# Patient Record
Sex: FEMALE | Race: WHITE | NOT HISPANIC OR LATINO | Employment: OTHER | URBAN - METROPOLITAN AREA
[De-identification: names, ages, dates, MRNs, and addresses within clinical notes are randomized per-mention and may not be internally consistent; named-entity substitution may affect disease eponyms.]

---

## 2018-05-30 ENCOUNTER — TRANSFERRED RECORDS (OUTPATIENT)
Dept: HEALTH INFORMATION MANAGEMENT | Facility: CLINIC | Age: 48
End: 2018-05-30

## 2018-06-04 ENCOUNTER — TRANSFERRED RECORDS (OUTPATIENT)
Dept: HEALTH INFORMATION MANAGEMENT | Facility: CLINIC | Age: 48
End: 2018-06-04

## 2018-08-09 ENCOUNTER — OFFICE VISIT (OUTPATIENT)
Dept: MIDWIFE SERVICES | Facility: CLINIC | Age: 48
End: 2018-08-09
Payer: COMMERCIAL

## 2018-08-09 VITALS
SYSTOLIC BLOOD PRESSURE: 120 MMHG | BODY MASS INDEX: 28.66 KG/M2 | DIASTOLIC BLOOD PRESSURE: 77 MMHG | TEMPERATURE: 98.3 F | WEIGHT: 172 LBS | HEIGHT: 65 IN | HEART RATE: 100 BPM

## 2018-08-09 DIAGNOSIS — Z00.00 ROUTINE GENERAL MEDICAL EXAMINATION AT A HEALTH CARE FACILITY: Primary | ICD-10-CM

## 2018-08-09 LAB
ALBUMIN SERPL-MCNC: 3.9 G/DL (ref 3.4–5)
ALP SERPL-CCNC: 79 U/L (ref 40–150)
ALT SERPL W P-5'-P-CCNC: 27 U/L (ref 0–50)
ANION GAP SERPL CALCULATED.3IONS-SCNC: 7 MMOL/L (ref 3–14)
AST SERPL W P-5'-P-CCNC: 18 U/L (ref 0–45)
BILIRUB DIRECT SERPL-MCNC: <0.1 MG/DL (ref 0–0.2)
BILIRUB SERPL-MCNC: 0.3 MG/DL (ref 0.2–1.3)
BUN SERPL-MCNC: 10 MG/DL (ref 7–30)
CALCIUM SERPL-MCNC: 8.5 MG/DL (ref 8.5–10.1)
CHLORIDE SERPL-SCNC: 103 MMOL/L (ref 94–109)
CHOLEST SERPL-MCNC: 245 MG/DL
CO2 SERPL-SCNC: 28 MMOL/L (ref 20–32)
CREAT SERPL-MCNC: 0.74 MG/DL (ref 0.52–1.04)
ERYTHROCYTE [DISTWIDTH] IN BLOOD BY AUTOMATED COUNT: 13.7 % (ref 10–15)
GFR SERPL CREATININE-BSD FRML MDRD: 84 ML/MIN/1.7M2
GLUCOSE SERPL-MCNC: 80 MG/DL (ref 70–99)
HBA1C MFR BLD: 5.1 % (ref 0–5.6)
HCT VFR BLD AUTO: 41.3 % (ref 35–47)
HDLC SERPL-MCNC: 50 MG/DL
HGB BLD-MCNC: 13.9 G/DL (ref 11.7–15.7)
LDLC SERPL CALC-MCNC: 166 MG/DL
MCH RBC QN AUTO: 29.4 PG (ref 26.5–33)
MCHC RBC AUTO-ENTMCNC: 33.7 G/DL (ref 31.5–36.5)
MCV RBC AUTO: 87 FL (ref 78–100)
NONHDLC SERPL-MCNC: 195 MG/DL
PLATELET # BLD AUTO: 204 10E9/L (ref 150–450)
POTASSIUM SERPL-SCNC: 3.6 MMOL/L (ref 3.4–5.3)
PROT SERPL-MCNC: 7.3 G/DL (ref 6.8–8.8)
RBC # BLD AUTO: 4.73 10E12/L (ref 3.8–5.2)
SODIUM SERPL-SCNC: 138 MMOL/L (ref 133–144)
TRIGL SERPL-MCNC: 146 MG/DL
TSH SERPL DL<=0.005 MIU/L-ACNC: 0.59 MU/L (ref 0.4–4)
WBC # BLD AUTO: 5.4 10E9/L (ref 4–11)

## 2018-08-09 PROCEDURE — 85027 COMPLETE CBC AUTOMATED: CPT | Performed by: ADVANCED PRACTICE MIDWIFE

## 2018-08-09 PROCEDURE — 36415 COLL VENOUS BLD VENIPUNCTURE: CPT | Performed by: ADVANCED PRACTICE MIDWIFE

## 2018-08-09 PROCEDURE — 83036 HEMOGLOBIN GLYCOSYLATED A1C: CPT | Performed by: ADVANCED PRACTICE MIDWIFE

## 2018-08-09 PROCEDURE — 82248 BILIRUBIN DIRECT: CPT | Performed by: ADVANCED PRACTICE MIDWIFE

## 2018-08-09 PROCEDURE — 99396 PREV VISIT EST AGE 40-64: CPT | Performed by: ADVANCED PRACTICE MIDWIFE

## 2018-08-09 PROCEDURE — 80061 LIPID PANEL: CPT | Performed by: ADVANCED PRACTICE MIDWIFE

## 2018-08-09 PROCEDURE — 84443 ASSAY THYROID STIM HORMONE: CPT | Performed by: ADVANCED PRACTICE MIDWIFE

## 2018-08-09 PROCEDURE — 80053 COMPREHEN METABOLIC PANEL: CPT | Performed by: ADVANCED PRACTICE MIDWIFE

## 2018-08-09 NOTE — MR AVS SNAPSHOT
After Visit Summary   8/9/2018    April Fischer    MRN: [id number]           Patient Information     Date Of Birth          1970        Visit Information        Provider Department      8/9/2018 10:30 AM Jose Miguel Rodríguez APRN CNM [facility]        Today's Diagnoses     Routine general medical examination at a health care facility    -  1       Follow-ups after your visit        Additional Services     GASTROENTEROLOGY ADULT REF PROCEDURE ONLY [facility] [phone]       Last Lab Result: Creatinine (mg/dL)       Date                     Value                 11/17/2016               0.77             ----------  Body mass index is 28.62 kg/(m^2).     Needed:  No  Language:  English    Patient will be contacted to schedule procedure.     Please be aware that coverage of these services is subject to the terms and limitations of your health insurance plan.  Call member services at your health plan with any benefit or coverage questions.  Any procedures must be performed at a [place] facility OR coordinated by your clinic's referral office.    Please bring the following with you to your appointment:    (1) Any X-Rays, CTs or MRIs which have been performed.  Contact the facility where they were done to arrange for  prior to your scheduled appointment.    (2) List of current medications   (3) This referral request   (4) Any documents/labs given to you for this referral                  Who to contact     If you have questions or need follow up information about today's clinic visit or your schedule please contact [facility] directly at [phone].  Normal or non-critical lab and imaging results will be communicated to you by MyChart, letter or phone within 4 business days after the clinic has received the results. If you do not hear from us within 7 days, please contact the clinic through MyChart or phone. If you have a critical or  "abnormal lab result, we will notify you by phone as soon as possible.  Submit refill requests through [employer] or call your pharmacy and they will forward the refill request to us. Please allow 3 business days for your refill to be completed.          Additional Information About Your Visit        [employer] Information     [employer] gives you secure access to your electronic health record. If you see a primary care provider, you can also send messages to your care team and make appointments. If you have questions, please call your primary care clinic.  If you do not have a primary care provider, please call [phone] and they will assist you.        Care EveryWhere ID     This is your Care EveryWhere ID. This could be used by other organizations to access your [facility] medical records  [id number]        Your Vitals Were     Pulse Temperature Height Last Period Breastfeeding? BMI (Body Mass Index)    100 98.3  F (36.8  C) (Oral) 5' 5\" (1.651 m) 07/09/2018 (Exact Date) No 28.62 kg/m2       Blood Pressure from Last 3 Encounters:   08/09/18 120/77   11/17/16 115/72   12/18/13 108/68    Weight from Last 3 Encounters:   08/09/18 172 lb (78 kg)   11/17/16 139 lb 8 oz (63.3 kg)   12/18/13 135 lb (61.2 kg)              We Performed the Following     Bilirubin direct     CBC with platelets     Comprehensive metabolic panel     GASTROENTEROLOGY ADULT REF PROCEDURE ONLY [facility] [phone]     Hemoglobin A1c     Lipid Profile (Chol, Trig, HDL, LDL calc)     TSH with free T4 reflex          Today's Medication Changes          These changes are accurate as of 8/9/18 11:51 AM.  If you have any questions, ask your nurse or doctor.               Stop taking these medicines if you haven't already. Please contact your care team if you have questions.     LORazepam 1 MG tablet   Commonly known as:  ATIVAN   Stopped by:  Jose Miguel Rodríguez APRN CNLALIT           ziprasidone 40 MG capsule   Commonly known as:  GEODON   Stopped by:  " Jose Miguel Rodríguez, APRN [facility]                    Primary Care Provider Fax #    Physician No Ref-Primary [phone]       No address on file        Equal Access to Services     CHARLES PATIÑO : Irma corona jensen lizbeth Morales, davidda lufreida, zackaryta kajoselineda lincoln, stephane corderorhona kavitha. So [facility] [phone].    ATENCIÓN: Si habla español, tiene a neal disposición servicios gratuitos de asistencia lingüística. Llame al [phone].    We comply with applicable federal civil rights laws and [place] laws. We do not discriminate on the basis of race, color, national origin, age, disability, sex, sexual orientation, or gender identity.            Thank you!     Thank you for choosing [facility]  for your care. Our goal is always to provide you with excellent care. Hearing back from our patients is one way we can continue to improve our services. Please take a few minutes to complete the written survey that you may receive in the mail after your visit with us. Thank you!             Your Updated Medication List - Protect others around you: Learn how to safely use, store and throw away your medicines at www.disposemymeds.org.          This list is accurate as of 8/9/18 11:51 AM.  Always use your most recent med list.                   Brand Name Dispense Instructions for use Diagnosis    lamoTRIgine 100 MG tablet    LAMICTAL    60 tablet    Take 1.5 tablets (150 mg) by mouth 2 times daily    Bipolar 2 disorder (H)       QUEtiapine 25 MG tablet    SEROquel    30 tablet    Take 1 tablet by mouth nightly as needed (insomnia).    Bipolar 2 disorder (H)       venlafaxine 75 MG Tb24 24 hr tablet    EFFEXOR-ER    90 tablet    Take 1 tablet (75 mg) by mouth daily    Bipolar 2 disorder (H)

## 2018-08-09 NOTE — PROGRESS NOTES
April is a 48 year old  female who presents for annual exam.     Menses are irregular and spotty and normal and irregular lasting 3 days.  Menses flow: spotty.  Patient's last menstrual period was 2018 (exact date).. Using none for contraception.  She is not currently considering pregnancy.  Besides routine health maintenance, she has no other health concerns today .  GYNECOLOGIC HISTORY:  Menarche: 13    April is sexually active with 1male partner(s) and is currently in monogamous relationship with .    History sexually transmitted infections:No STD history  STI testing offered?  Declined  YOSSI exposure: No  History of abnormal Pap smear: NO - age 30- 65 PAP every 3 years recommended  Family history of breast CA: Yes (Please explain): grandmother  Family history of uterine/ovarian CA: No    Family history of colon CA: No    HEALTH MAINTENANCE:  Cholesterol: (  Cholesterol   Date Value Ref Range Status   2016 228 (H) <200 mg/dL Final     Comment:     Desirable:       <200 mg/dl   2013 240 (H) 0 - 200 mg/dL Final     Comment:     LDL Cholesterol is the primary guide to therapy.   The NCEP recommends further evaluation of: patients with cholesterol greater   than 200 mg/dL if additional risk factors are present, cholesterol greater   than   240 mg/dL, triglycerides greater than 150 mg/dL, or HDL less than 40 mg/dL.    History of abnormal lipids: No  Mammo: 16 . History of abnormal Mammo: No.  Regular Self Breast Exams: No  Calcium/Vitamin D intake: source:  dairy Adequate? Yes  TSH: (  TSH   Date Value Ref Range Status   2016 0.75 0.40 - 4.00 mU/L Final    )  Pap; (  Lab Results   Component Value Date    PAP NIL 2016    PAP NIL 2013    PAP NIL 2007    )    HISTORY:  Obstetric History       T2      L3     SAB1   TAB1   Ectopic0   Multiple0   Live Births3       # Outcome Date GA Lbr Humberto/2nd Weight Sex Delivery Anes PTL Lv   5  08  "36w0d  6 lb 11 oz (3.033 kg) F    GUY      Name: Emily   4 Term 07 38w0d  8 lb 3 oz (3.714 kg) M    GUY      Name: Zachary   3 SAB 06     SAB   DEC   2 Term 00 39w0d 36:00 6 lb 12 oz (3.062 kg) F    GUY      Name: Cecilia   1 TAB 92     TAB   DEC      Obstetric Comments   Therapeutic rest.     Past Medical History:   Diagnosis Date     Adjustment disorder with mixed anxiety and depressed mood     meds \"[facility]\" on meds in past. (unsure type rx) Stopped declines restart.     Past Surgical History:   Procedure Laterality Date     NO HISTORY OF SURGERY       Family History   Problem Relation Age of Onset     Psychotic Disorder Mother      Many family members with depression.     Hypertension Maternal Grandmother      Breast Cancer Maternal Grandmother      Neurologic Disorder Maternal Grandmother      Cerebrovascular Disease Maternal Grandfather      Diabetes Paternal Grandfather      Neurologic Disorder Maternal Aunt      Neurologic Disorder Maternal Aunt      Neurologic Disorder Maternal Uncle      Neurologic Disorder Brother      Genetic Disorder Daughter      cystic fibrosis     Social History     Social History     Marital status:      Spouse name: N/A     Number of children: 3     Years of education: N/A     Occupational History      Self     Social History Main Topics     Smoking status: Never Smoker     Smokeless tobacco: None     Alcohol use 0.0 oz/week     0 Standard drinks or equivalent per week      Comment: once a wk     Drug use: No     Sexual activity: Yes     Partners: Male     Birth control/ protection: None     Other Topics Concern     None     Social History Narrative       Current Outpatient Prescriptions:      lamoTRIgine (LAMICTAL) 100 MG tablet, Take 1.5 tablets (150 mg) by mouth 2 times daily, Disp: 60 tablet, Rfl: 3     LORazepam (ATIVAN) 1 MG tablet, Take 1 tablet (1 mg) by mouth every 8 hours as needed for anxiety, Disp: 90 tablet, Rfl: 0     " "QUEtiapine (SEROQUEL) 25 MG tablet, Take 1 tablet by mouth nightly as needed (insomnia)., Disp: 30 tablet, Rfl: none     venlafaxine (EFFEXOR-ER) 75 MG TB24, Take 1 tablet (75 mg) by mouth daily, Disp: 90 tablet, Rfl: 3     ziprasidone (GEODON) 40 MG capsule, Take 1 capsule (40 mg) by mouth At Bedtime, Disp: 90 capsule, Rfl: 3   No Known Allergies    Past medical, surgical, social and family history were reviewed and updated in EPIC.    ROS:   C:     NEGATIVE for fever, chills, change in weight  I:       NEGATIVE for worrisome rashes, moles or lesions  E:     NEGATIVE for vision changes or irritation  E/M: NEGATIVE for ear, mouth and throat problems  R:     NEGATIVE for significant cough or SOB  CV:   NEGATIVE for chest pain, palpitations or peripheral edema  GI:     NEGATIVE for nausea, abdominal pain, heartburn, or change in bowel habits  :   NEGATIVE for frequency, dysuria, hematuria, vaginal discharge, or irregular bleeding  M:     NEGATIVE for significant arthralgias or myalgia  N:      NEGATIVE for weakness, dizziness or paresthesias  E:      NEGATIVE for temperature intolerance, skin/hair changes  P:      NEGATIVE for changes in mood or affect.    EXAM:  /77  Pulse 100  Temp 98.3  F (36.8  C) (Oral)  Ht 5' 5\" (1.651 m)  Wt 172 lb (78 kg)  LMP 07/09/2018 (Exact Date)  Breastfeeding? No  BMI 28.62 kg/m2   BMI: Body mass index is 28.62 kg/(m^2).  Constitutional: healthy, alert and no distress  Head: Normocephalic. No masses, lesions, tenderness or abnormalities  Neck: Neck supple. Trachea midline. No adenopathy. Thyroid symmetric, normal size.   Cardiovascular: RRR.   Respiratory: Negative.   Breast: No nodularity, asymmetry or nipple discharge bilaterally.  Gastrointestinal: Abdomen soft, non-tender, non-distended. No masses, organomegaly.  :  Vulva:  No external lesions, normal female hair distribution, no inguinal adenopathy.    Urethra:  Midline, non-tender, well supported, no " discharge  Vagina:  Moist, pink, no abnormal discharge, no lesions  Uterus:  Normal size, AV , non-tender, freely mobile  Ovaries:  No masses appreciated, non-tender, mobile  Rectal Exam: deferred  Musculoskeletal: extremities normal  Skin: no suspicious lesions or rashes  Psychiatric: Affect appropriate, cooperative,mentation appears normal.     COUNSELING:   Reviewed preventive health counseling, as reflected in patient instructions       Regular exercise       Healthy diet/nutrition       Colon cancer screening   reports that she has never smoked. She has never used smokeless tobacco.    Body mass index is 28.62 kg/(m^2).  Weight management plan: Discussed healthy diet and exercise guidelines and patient will follow up in 12 months in clinic to re-evaluate.  FRAX Risk Assessment    ASSESSMENT:  48 year old female with satisfactory annual exam  (Z00.00) Routine general medical examination at a health care facility  (primary encounter diagnosis)  Comment:   Plan: Lipid Profile (Chol, Trig, HDL, LDL calc),         Hemoglobin A1c, Comprehensive metabolic panel,         GASTROENTEROLOGY ADULT REF PROCEDURE ONLY         [facility] [phone], TSH with free         T4 reflex, Bilirubin direct, CBC with         platelets, CANCELED: Glucose, CANCELED: Hepatic        panel (Albumin, ALT, AST, Bili, Alk Phos, TP)

## 2018-08-09 NOTE — NURSING NOTE
"Chief Complaint   Patient presents with     Physical       Initial /77  Pulse 100  Temp 98.3  F (36.8  C) (Oral)  Ht 5' 5\" (1.651 m)  Wt 172 lb (78 kg)  LMP 2018 (Exact Date)  Breastfeeding? No  BMI 28.62 kg/m2 Estimated body mass index is 28.62 kg/(m^2) as calculated from the following:    Height as of this encounter: 5' 5\" (1.651 m).    Weight as of this encounter: 172 lb (78 kg).  BP completed using cuff size: regular        The following HM Due: NONE      The following patient reported/Care Every where data was sent to:  P ABSTRACT QUALITY INITIATIVES [[id number]]  na      n/a              "

## 2019-03-31 ASSESSMENT — ENCOUNTER SYMPTOMS
FLANK PAIN: 0
NECK MASS: 0
LOSS OF CONSCIOUSNESS: 0
JOINT SWELLING: 0
SINUS PAIN: 0
MEMORY LOSS: 0
PARALYSIS: 0
TASTE DISTURBANCE: 0
MYALGIAS: 1
BOWEL INCONTINENCE: 0
MUSCLE CRAMPS: 1
INSOMNIA: 1
CONSTIPATION: 1
HOT FLASHES: 1
SPEECH CHANGE: 0
DIZZINESS: 0
SORE THROAT: 0
TINGLING: 0
DEPRESSION: 1
DYSURIA: 0
FATIGUE: 1
DECREASED CONCENTRATION: 1
DIFFICULTY URINATING: 0
JAUNDICE: 0
POLYPHAGIA: 0
TROUBLE SWALLOWING: 0
FEVER: 0
SEIZURES: 0
CHILLS: 0
HALLUCINATIONS: 0
PANIC: 0
DIARRHEA: 0
NUMBNESS: 0
HOARSE VOICE: 0
HEARTBURN: 0
WEAKNESS: 0
POLYDIPSIA: 0
VOMITING: 0
SINUS CONGESTION: 0
BLOOD IN STOOL: 0
NAUSEA: 0
RECTAL PAIN: 0
ARTHRALGIAS: 1
NERVOUS/ANXIOUS: 1
DECREASED APPETITE: 0
NECK PAIN: 1
DISTURBANCES IN COORDINATION: 0
SMELL DISTURBANCE: 0
STIFFNESS: 0
HEMATURIA: 0
BLOATING: 1
DECREASED LIBIDO: 0
WEIGHT LOSS: 0
HEADACHES: 1
ABDOMINAL PAIN: 0
MUSCLE WEAKNESS: 0
NIGHT SWEATS: 1
WEIGHT GAIN: 1
INCREASED ENERGY: 1
ALTERED TEMPERATURE REGULATION: 1
BACK PAIN: 1
TREMORS: 0

## 2019-03-31 ASSESSMENT — ANXIETY QUESTIONNAIRES
7. FEELING AFRAID AS IF SOMETHING AWFUL MIGHT HAPPEN: SEVERAL DAYS
2. NOT BEING ABLE TO STOP OR CONTROL WORRYING: NEARLY EVERY DAY
GAD7 TOTAL SCORE: 14
GAD7 TOTAL SCORE: 14
7. FEELING AFRAID AS IF SOMETHING AWFUL MIGHT HAPPEN: SEVERAL DAYS
3. WORRYING TOO MUCH ABOUT DIFFERENT THINGS: NEARLY EVERY DAY
4. TROUBLE RELAXING: NEARLY EVERY DAY
5. BEING SO RESTLESS THAT IT IS HARD TO SIT STILL: SEVERAL DAYS
6. BECOMING EASILY ANNOYED OR IRRITABLE: SEVERAL DAYS
1. FEELING NERVOUS, ANXIOUS, OR ON EDGE: MORE THAN HALF THE DAYS

## 2019-03-31 ASSESSMENT — PATIENT HEALTH QUESTIONNAIRE - PHQ9
SUM OF ALL RESPONSES TO PHQ QUESTIONS 1-9: 15
10. IF YOU CHECKED OFF ANY PROBLEMS, HOW DIFFICULT HAVE THESE PROBLEMS MADE IT FOR YOU TO DO YOUR WORK, TAKE CARE OF THINGS AT HOME, OR GET ALONG WITH OTHER PEOPLE: EXTREMELY DIFFICULT
SUM OF ALL RESPONSES TO PHQ QUESTIONS 1-9: 15

## 2019-04-01 ENCOUNTER — OFFICE VISIT (OUTPATIENT)
Dept: FAMILY MEDICINE | Facility: CLINIC | Age: 49
End: 2019-04-01
Attending: FAMILY MEDICINE
Payer: COMMERCIAL

## 2019-04-01 ENCOUNTER — TELEPHONE (OUTPATIENT)
Dept: GASTROENTEROLOGY | Facility: CLINIC | Age: 49
End: 2019-04-01

## 2019-04-01 VITALS
SYSTOLIC BLOOD PRESSURE: 127 MMHG | BODY MASS INDEX: 31.29 KG/M2 | HEIGHT: 64 IN | DIASTOLIC BLOOD PRESSURE: 76 MMHG | WEIGHT: 183.3 LBS | HEART RATE: 120 BPM

## 2019-04-01 DIAGNOSIS — G44.209 TENSION-TYPE HEADACHE, NOT INTRACTABLE, UNSPECIFIED CHRONICITY PATTERN: ICD-10-CM

## 2019-04-01 DIAGNOSIS — M54.50 CHRONIC BILATERAL LOW BACK PAIN WITHOUT SCIATICA: ICD-10-CM

## 2019-04-01 DIAGNOSIS — R35.0 URINARY FREQUENCY: ICD-10-CM

## 2019-04-01 DIAGNOSIS — Z76.89 ENCOUNTER TO ESTABLISH CARE: Primary | ICD-10-CM

## 2019-04-01 DIAGNOSIS — G47.00 INSOMNIA, UNSPECIFIED TYPE: ICD-10-CM

## 2019-04-01 DIAGNOSIS — F31.9 BIPOLAR AFFECTIVE DISORDER, REMISSION STATUS UNSPECIFIED (H): ICD-10-CM

## 2019-04-01 DIAGNOSIS — R63.5 WEIGHT GAIN: ICD-10-CM

## 2019-04-01 DIAGNOSIS — G89.29 CHRONIC BILATERAL LOW BACK PAIN WITHOUT SCIATICA: ICD-10-CM

## 2019-04-01 DIAGNOSIS — N95.1 PERIMENOPAUSAL SYMPTOMS: ICD-10-CM

## 2019-04-01 DIAGNOSIS — Z76.89 ENCOUNTER TO ESTABLISH CARE: ICD-10-CM

## 2019-04-01 DIAGNOSIS — M54.2 NECK PAIN: ICD-10-CM

## 2019-04-01 LAB
ALBUMIN UR-MCNC: NEGATIVE MG/DL
ANION GAP SERPL CALCULATED.3IONS-SCNC: 8 MMOL/L (ref 3–14)
APPEARANCE UR: CLEAR
BILIRUB UR QL STRIP: NEGATIVE
BUN SERPL-MCNC: 13 MG/DL (ref 7–30)
CALCIUM SERPL-MCNC: 8.9 MG/DL (ref 8.5–10.1)
CHLORIDE SERPL-SCNC: 103 MMOL/L (ref 94–109)
CO2 SERPL-SCNC: 28 MMOL/L (ref 20–32)
COLOR UR AUTO: ABNORMAL
CREAT SERPL-MCNC: 0.72 MG/DL (ref 0.52–1.04)
GFR SERPL CREATININE-BSD FRML MDRD: >90 ML/MIN/{1.73_M2}
GLUCOSE SERPL-MCNC: 95 MG/DL (ref 70–99)
GLUCOSE UR STRIP-MCNC: NEGATIVE MG/DL
HGB UR QL STRIP: NEGATIVE
KETONES UR STRIP-MCNC: NEGATIVE MG/DL
LEUKOCYTE ESTERASE UR QL STRIP: NEGATIVE
MUCOUS THREADS #/AREA URNS LPF: PRESENT /LPF
NITRATE UR QL: NEGATIVE
PH UR STRIP: 7 PH (ref 5–7)
POTASSIUM SERPL-SCNC: 3.7 MMOL/L (ref 3.4–5.3)
RBC #/AREA URNS AUTO: <1 /HPF (ref 0–2)
SODIUM SERPL-SCNC: 139 MMOL/L (ref 133–144)
SOURCE: ABNORMAL
SP GR UR STRIP: 1.01 (ref 1–1.03)
SQUAMOUS #/AREA URNS AUTO: 1 /HPF (ref 0–1)
UROBILINOGEN UR STRIP-MCNC: NORMAL MG/DL (ref 0–2)
WBC #/AREA URNS AUTO: <1 /HPF (ref 0–5)

## 2019-04-01 PROCEDURE — 36415 COLL VENOUS BLD VENIPUNCTURE: CPT | Performed by: FAMILY MEDICINE

## 2019-04-01 PROCEDURE — 90715 TDAP VACCINE 7 YRS/> IM: CPT | Mod: ZF

## 2019-04-01 PROCEDURE — 81001 URINALYSIS AUTO W/SCOPE: CPT | Performed by: FAMILY MEDICINE

## 2019-04-01 PROCEDURE — G0463 HOSPITAL OUTPT CLINIC VISIT: HCPCS | Mod: ZF

## 2019-04-01 PROCEDURE — 25000128 H RX IP 250 OP 636: Mod: ZF

## 2019-04-01 PROCEDURE — 80048 BASIC METABOLIC PNL TOTAL CA: CPT | Performed by: FAMILY MEDICINE

## 2019-04-01 PROCEDURE — 90471 IMMUNIZATION ADMIN: CPT | Mod: ZF

## 2019-04-01 RX ORDER — LEVONORGESTREL/ETHIN.ESTRADIOL 0.1-0.02MG
1 TABLET ORAL DAILY
Qty: 90 TABLET | Refills: 3 | Status: SHIPPED | OUTPATIENT
Start: 2019-04-01 | End: 2020-06-11

## 2019-04-01 SDOH — HEALTH STABILITY: MENTAL HEALTH
DO YOU FEEL STRESS - TENSE, RESTLESS, NERVOUS, OR ANXIOUS, OR UNABLE TO SLEEP AT NIGHT BECAUSE YOUR MIND IS TROUBLED ALL THE TIME - THESE DAYS?: VERY MUCH

## 2019-04-01 SDOH — HEALTH STABILITY: PHYSICAL HEALTH: ON AVERAGE, HOW MANY DAYS PER WEEK DO YOU ENGAGE IN MODERATE TO STRENUOUS EXERCISE (LIKE A BRISK WALK)?: 5 DAYS

## 2019-04-01 SDOH — HEALTH STABILITY: PHYSICAL HEALTH: ON AVERAGE, HOW MANY MINUTES DO YOU ENGAGE IN EXERCISE AT THIS LEVEL?: 30 MIN

## 2019-04-01 ASSESSMENT — MIFFLIN-ST. JEOR: SCORE: 1428.5

## 2019-04-01 ASSESSMENT — PAIN SCALES - GENERAL: PAINLEVEL: NO PAIN (0)

## 2019-04-01 ASSESSMENT — ANXIETY QUESTIONNAIRES: GAD7 TOTAL SCORE: 14

## 2019-04-01 NOTE — LETTER
4/1/2019       RE: April Fischer  [address]   [place] [address]     Dear Colleague,    Thank you for referring your patient, April Fischer, to the [facility] at [facility]. Please see a copy of my visit note below.              Primary Care Center  New Patient Evaluation    Name: April Fischer MRN: [id number]       Age: 48 year old           Chief Complaint:    YOB: 1970       CC:establish care  mutiple issues            HPI and ROS:   HPI:    48 year old female here to establish care.  She has multiple issues - she doesn't feel well.  She feels fatigued and exhausted.  She does not sleep well.  She has nightmares.  Her psychiatrist had given her some sleeping pills but they gave her nightmares. She stopped them after several nights     She also gets frequent headaches.  They are bilateral, she gets nauseated with them, and she has to be in a quiet place.   She also gets headaches with her menses.    She has neck pain is that is bothering her.  She also has low back pain.  She has tried massage and that helps for the moment but does not last.  She is under a lot of stress.  She is also now been using a heating pad.    She is also gained 45 pounds in the last 14 months.  She claims she has not changed her eating habits.  This depresses her.    She has bipolar disorder  and sees a psychiatrist at [facility].  This person sees her about every 3 months and has been regulating her meds.    She feels very anxious. She is on effexor but doesn't feel this helps     She is  [id number]  who gets gyn exams by Dr Rodríguez.  She had a pap last year and was normal.   LMP was 3/14/2019 - gets HA 2 days before menses  and bleeds 1 day - fairly light.  -more like  spotting - happening every  few months, getting some night sweats, feels her memory is going -   Has poor concentration.      She also urinates 10-12  times/day      ROS:Answers for HPI/ROS submitted by the patient on 3/31/2019   JERMAN 7 TOTAL SCORE: 14  If you checked off any problems, how difficult have these problems made it for you to do your work, take care of things at home, or get along with other people?: Extremely difficult  PHQ9 TOTAL SCORE: 15  General Symptoms: Yes  Skin Symptoms: No  HENT Symptoms: Yes  EYE SYMPTOMS: No  HEART SYMPTOMS: No  LUNG SYMPTOMS: No  INTESTINAL SYMPTOMS: Yes  URINARY SYMPTOMS: Yes  GYNECOLOGIC SYMPTOMS: Yes  BREAST SYMPTOMS: No  SKELETAL SYMPTOMS: Yes  BLOOD SYMPTOMS: No  NERVOUS SYSTEM SYMPTOMS: Yes  MENTAL HEALTH SYMPTOMS: Yes  Fever: No  Loss of appetite: No  Weight loss: No  Weight gain: Yes  Fatigue: Yes  Night sweats: Yes  Chills: No  Increased stress: Yes  Excessive hunger: No  Excessive thirst: No  Feeling hot or cold when others believe the temperature is normal: Yes  Loss of height: No  Post-operative complications: No  Surgical site pain: No  Hallucinations: No  Change in or Loss of Energy: Yes  Hyperactivity: No  Confusion: No  Ear pain: No  Ear discharge: No  Hearing loss: No  Tinnitus: No  Nosebleeds: Yes  Congestion: No  Sinus pain: No  Trouble swallowing: No   Voice hoarseness: No  Mouth sores: No  Sore throat: No  Tooth pain: No  Gum tenderness: No  Bleeding gums: No  Change in taste: No  Change in sense of smell: No  Dry mouth: Yes  Hearing aid used: No  Neck lump: No  Heart burn or indigestion: No  Nausea: No  Vomiting: No  Abdominal pain: No  Bloating: Yes  Constipation: Yes  Diarrhea: No  Blood in stool: No  Black stools: No  Rectal or Anal pain: No  Fecal incontinence: No  Yellowing of skin or eyes: No  Vomit with blood: No  Change in stools: Yes  Trouble holding urine or incontinence: Yes  Pain or burning: No  Trouble starting or stopping: No  Increased frequency of urination: Yes  Blood in urine: No  Decreased frequency of urination: No  Frequent nighttime urination: Yes  Flank pain: No  Difficulty  "emptying bladder: No  Back pain: Yes  Muscle aches: Yes  Neck pain: Yes  Swollen joints: No  Joint pain: Yes  Bone pain: No  Muscle cramps: Yes  Muscle weakness: No  Joint stiffness: No  Bone fracture: No  Trouble with coordination: No  Dizziness or trouble with balance: No  Fainting or black-out spells: No  Memory loss: No  Headache: Yes  Seizures: No  Speech problems: No  Tingling: No  Tremor: No  Weakness: No  Difficulty walking: No  Paralysis: No  Numbness: No  Bleeding or spotting between periods: Yes  Heavy or painful periods: No  Irregular periods: Yes  Vaginal discharge: No  Hot flashes: Yes  Vaginal dryness: No  Genital ulcers: No  Reduced libido: No  Painful intercourse: No  Difficulty with sexual arousal: No  Post-menopausal bleeding: No  Nervous or Anxious: Yes  Depression: Yes  Trouble sleeping: Yes  Trouble thinking or concentrating: Yes  Mood changes: Yes  Panic attacks: No             Past Medical History:     Past Medical History:   Diagnosis Date     Adjustment disorder with mixed anxiety and depressed mood     meds \"[facility]\" on meds in past. (unsure type rx) Stopped4/06 declines restart.     Bipolar 2 disorder, major depressive episode (H)      Perimenopause      Weight gain              Past Surgical History:      Past Surgical History:   Procedure Laterality Date     NO HISTORY OF SURGERY               Social History:     Social History     Socioeconomic History     Marital status:      Spouse name: Not on file     Number of children: 3     Years of education: Not on file     Highest education level: Not on file   Occupational History     Employer: SELF   Social Needs     Financial resource strain: Not on file     Food insecurity:     Worry: Not on file     Inability: Not on file     Transportation needs:     Medical: Not on file     Non-medical: Not on file   Tobacco Use     Smoking status: Never Smoker     Smokeless tobacco: Never Used   Substance and Sexual Activity     Alcohol " use: Yes     Alcohol/week: 0.0 oz     Comment: 1-2 beers per week if anything     Drug use: No     Sexual activity: Yes     Partners: Male     Birth control/protection: Male Surgical   Lifestyle     Physical activity:     Days per week: 5 days     Minutes per session: 30 min     Stress: Very much   Relationships     Social connections:     Talks on phone: Not on file     Gets together: Not on file     Attends Congregation service: Not on file     Active member of club or organization: Not on file     Attends meetings of clubs or organizations: Not on file     Relationship status: Not on file     Intimate partner violence:     Fear of current or ex partner: Not on file     Emotionally abused: Not on file     Physically abused: Not on file     Forced sexual activity: Not on file   Other Topics Concern     Parent/sibling w/ CABG, MI or angioplasty before 65F 55M? Not Asked   Social History Narrative    , has an 17 yo with cystic fibrosis - all going to [place] next week as a gift to this 17yo from the [employer].   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    Melania Taylor MD, PhD              Family History:     Family History   Problem Relation Age of Onset     Psychotic Disorder Mother         Many family members with depression.     Hypertension Maternal Grandmother      Breast Cancer Maternal Grandmother      Neurologic Disorder Maternal Grandmother      Anxiety Disorder Maternal Grandmother         MANY family members deal with anxiety     Mental Illness Maternal Grandmother         MANY family members     Depression Maternal Grandmother      Cerebrovascular Disease Maternal Grandfather      Diabetes Paternal Grandfather      Neurologic Disorder Maternal Aunt      Neurologic Disorder Maternal Aunt      Neurologic Disorder Maternal Uncle      Neurologic Disorder Brother      Genetic Disorder Daughter         cystic fibrosis     Anxiety Disorder Son      Mental Illness Son      Depression Son   "    Genetic Disorder Son         Transgender (born female, Cecilia)     Anxiety Disorder Cousin      Mental Illness Other      Depression Other                  Immunizations:     Immunization History   Administered Date(s) Administered     Influenza (IIV3) PF 11/14/2007     Influenza Vaccine IM 3yrs+ 4 Valent IIV4 12/18/2013     TDAP Vaccine (Boostrix) 04/01/2019             Allergies:   No Known Allergies          Medications:     Current Outpatient Medications on File Prior to Visit:  lamoTRIgine (LAMICTAL) 100 MG tablet Take 1.5 tablets (150 mg) by mouth 2 times daily   venlafaxine (EFFEXOR-ER) 75 MG TB24 Take 1 tablet (75 mg) by mouth daily     No current facility-administered medications on file prior to visit.          Exam:   /76   Pulse 120   Ht 1.613 m (5' 3.5\")   Wt 83.1 kg (183 lb 4.8 oz)   LMP 03/15/2019   BMI 31.96 kg/m       General: alert and conversational, somewhat emotional   Neck: supple, no palpable thyroid   CV: Normal S1,S2 with RRR no murmurs, rubs or gallops  Resp: Lungs CTA bilaterally with no wheezes or crackles  Abd: Soft, NTND with no organomegaly or masses  MSK: moderate muscular tenseness in bilateral trapezius area, no joint or bony abnormalities, normal muscle bulk, does have good ROM of neck but discomfort on later rotation.    Neuro: Grossly nonfocal, CNII-XII intact bilaterally, reflexes were 2/4 and equal bilaterally.   Skin: No concerning lesions or rashes        Labs:   A1C  Lab Results   Component Value Date    A1C 5.1 08/09/2018     JERMAN 14  PHQ9 = 16           Assessment and Plan:   ASSESSMENT:  This is a 48-year-old female who comes in to establish care and has bipolar affective disorder and does not feel well including headaches, neck pain, urinary frequency, insomnia, and perimenopausal symptoms.    (Z76.89) Encounter to establish care  (primary encounter diagnosis)  Comment: encouraged her to a mammogram, needs a screening colonoscopy- pap was done 2018, will " update tetanus and talked about shingrex.  Plan: Mammogram Screening Bilateral, Basic Metabolic         Panel, GASTROENTEROLOGY ADULT REF PROCEDURE         ONLY, TDAP VACCINE (BOOSTRIX)            (F31.9) Bipolar affective disorder, remission status unspecified (H)  Comment: she reports that she has more depressive symptoms - on meds, PHQ9 is 16.   Plan: per psychiatrist at [facility].    (G44.209) Tension-type headache, not intractable, unspecified chronicity pattern  Comment: question if this is related to her upper neck strain  Plan: PT to help with the muscle strain     (M54.2) Neck pain  Comment: as above - she has moderate muscle spasm of upper trapezius  Plan: PHYSICAL THERAPY REFERRAL            (M54.5,  G89.29) Chronic bilateral low back pain without sciatica  Comment: most likely muscular strain, no neurological syptoms  Plan: PHYSICAL THERAPY REFERRAL            (R35.0) Urinary frequency  Comment: questionable etiology - consider diabetes but A1C was 5.1 in 8/2018  Plan: Routine UA with micro reflex to culture, Basic         Metabolic Panel            (G47.00) Insomnia, unspecified type  Comment: this is likely multifactorial  Plan: per psychiatry    (N95.1) Perimenopausal symptoms  Comment: several of her  Symptoms could be perimenopausal  Plan: levonorgestrel-ethinyl estradiol         (AVIANE/ALESSE/LESSINA) 0.1-20 MG-MCG tablet        Will try BCP for 1-2 yrs and then change to HT.    (R63.5) Weight gain  Comment: she is obese with BMI of 31 Hard to address in light of her other problems   Plan: will bring this up again - offer weight loss clinic      RTC: 2 months       I spent a total of 50  minutes face-to-face with the patient during today's office visit.  Over 50% of this time was spent counseling the patient and/or coordinating care regarding headaches, perimenopause symptoms, anxiety, insomnia .  See note for details.  Melania Taylor MD, PhD                        Primary Care Center  [facility]  Patient Evaluation    Name: April Fischer MRN: [id number]       Age: 48 year old           Chief Complaint:    YOB: 1970       CC:establish care  mutiple issues            HPI and ROS:   HPI:    48 year old female here to establish care.  She has multiple issues - she doesn't feel well.  She feels fatigued and exhausted.  She does not sleep well.  She has nightmares.  Her psychiatrist had given her some sleeping pills but they gave her nightmares. She stopped them after several nights     She also gets frequent headaches.  They are bilateral, she gets nauseated with them, and she has to be in a quiet place.   She also gets headaches with her menses.    She has neck pain is that is bothering her.  She also has low back pain.  She has tried massage and that helps for the moment but does not last.  She is under a lot of stress.  She is also now been using a heating pad.    She is also gained 45 pounds in the last 14 months.  She claims she has not changed her eating habits.  This depresses her.    She has bipolar disorder  and sees a psychiatrist at [facility].  This person sees her about every 3 months and has been regulating her meds.    She feels very anxious. She is on effexor but doesn't feel this helps     She is  [id number]  who gets gyn exams by Dr Rodríguez.  She had a pap last year and was normal.   LMP was 3/14/2019 - gets HA 2 days before menses  and bleeds 1 day - fairly light.  -more like  spotting - happening every  few months, getting some night sweats, feels her memory is going -   Has poor concentration.      She also urinates 10-12 times/day      ROS:Answers for HPI/ROS submitted by the patient on 3/31/2019   JERMAN 7 TOTAL SCORE: 14  If you checked off any problems, how difficult have these problems made it for you to do your work, take care of things at home, or get along with other people?: Extremely difficult  PHQ9 TOTAL SCORE: 15  General Symptoms: Yes  Skin Symptoms: No  HENT  Symptoms: Yes  EYE SYMPTOMS: No  HEART SYMPTOMS: No  LUNG SYMPTOMS: No  INTESTINAL SYMPTOMS: Yes  URINARY SYMPTOMS: Yes  GYNECOLOGIC SYMPTOMS: Yes  BREAST SYMPTOMS: No  SKELETAL SYMPTOMS: Yes  BLOOD SYMPTOMS: No  NERVOUS SYSTEM SYMPTOMS: Yes  MENTAL HEALTH SYMPTOMS: Yes  Fever: No  Loss of appetite: No  Weight loss: No  Weight gain: Yes  Fatigue: Yes  Night sweats: Yes  Chills: No  Increased stress: Yes  Excessive hunger: No  Excessive thirst: No  Feeling hot or cold when others believe the temperature is normal: Yes  Loss of height: No  Post-operative complications: No  Surgical site pain: No  Hallucinations: No  Change in or Loss of Energy: Yes  Hyperactivity: No  Confusion: No  Ear pain: No  Ear discharge: No  Hearing loss: No  Tinnitus: No  Nosebleeds: Yes  Congestion: No  Sinus pain: No  Trouble swallowing: No   Voice hoarseness: No  Mouth sores: No  Sore throat: No  Tooth pain: No  Gum tenderness: No  Bleeding gums: No  Change in taste: No  Change in sense of smell: No  Dry mouth: Yes  Hearing aid used: No  Neck lump: No  Heart burn or indigestion: No  Nausea: No  Vomiting: No  Abdominal pain: No  Bloating: Yes  Constipation: Yes  Diarrhea: No  Blood in stool: No  Black stools: No  Rectal or Anal pain: No  Fecal incontinence: No  Yellowing of skin or eyes: No  Vomit with blood: No  Change in stools: Yes  Trouble holding urine or incontinence: Yes  Pain or burning: No  Trouble starting or stopping: No  Increased frequency of urination: Yes  Blood in urine: No  Decreased frequency of urination: No  Frequent nighttime urination: Yes  Flank pain: No  Difficulty emptying bladder: No  Back pain: Yes  Muscle aches: Yes  Neck pain: Yes  Swollen joints: No  Joint pain: Yes  Bone pain: No  Muscle cramps: Yes  Muscle weakness: No  Joint stiffness: No  Bone fracture: No  Trouble with coordination: No  Dizziness or trouble with balance: No  Fainting or black-out spells: No  Memory loss: No  Headache: Yes  Seizures:  "No  Speech problems: No  Tingling: No  Tremor: No  Weakness: No  Difficulty walking: No  Paralysis: No  Numbness: No  Bleeding or spotting between periods: Yes  Heavy or painful periods: No  Irregular periods: Yes  Vaginal discharge: No  Hot flashes: Yes  Vaginal dryness: No  Genital ulcers: No  Reduced libido: No  Painful intercourse: No  Difficulty with sexual arousal: No  Post-menopausal bleeding: No  Nervous or Anxious: Yes  Depression: Yes  Trouble sleeping: Yes  Trouble thinking or concentrating: Yes  Mood changes: Yes  Panic attacks: No             Past Medical History:     Past Medical History:   Diagnosis Date     Adjustment disorder with mixed anxiety and depressed mood     meds \"[facility]\" on meds in past. (unsure type rx) Stopped4/06 declines restart.     Bipolar 2 disorder, major depressive episode (H)      Perimenopause      Weight gain              Past Surgical History:      Past Surgical History:   Procedure Laterality Date     NO HISTORY OF SURGERY               Social History:     Social History     Socioeconomic History     Marital status:      Spouse name: Not on file     Number of children: 3     Years of education: Not on file     Highest education level: Not on file   Occupational History     Employer: SELF   Social Needs     Financial resource strain: Not on file     Food insecurity:     Worry: Not on file     Inability: Not on file     Transportation needs:     Medical: Not on file     Non-medical: Not on file   Tobacco Use     Smoking status: Never Smoker     Smokeless tobacco: Never Used   Substance and Sexual Activity     Alcohol use: Yes     Alcohol/week: 0.0 oz     Comment: 1-2 beers per week if anything     Drug use: No     Sexual activity: Yes     Partners: Male     Birth control/protection: Male Surgical   Lifestyle     Physical activity:     Days per week: 5 days     Minutes per session: 30 min     Stress: Very much   Relationships     Social connections:     Talks on " phone: Not on file     Gets together: Not on file     Attends Roman [employer] service: Not on file     Active member of club or organization: Not on file     Attends meetings of clubs or organizations: Not on file     Relationship status: Not on file     Intimate partner violence:     Fear of current or ex partner: Not on file     Emotionally abused: Not on file     Physically abused: Not on file     Forced sexual activity: Not on file   Other Topics Concern     Parent/sibling w/ CABG, MI or angioplasty before 65F 55M? Not Asked   Social History Narrative    , has an 19 yo with cystic fibrosis - all going to [facility] next week as a gift to this 17yo from the [place].   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    Melania Taylor MD, PhD              Family History:     Family History   Problem Relation Age of Onset     Psychotic Disorder Mother         Many family members with depression.     Hypertension Maternal Grandmother      Breast Cancer Maternal Grandmother      Neurologic Disorder Maternal Grandmother      Anxiety Disorder Maternal Grandmother         MANY family members deal with anxiety     Mental Illness Maternal Grandmother         MANY family members     Depression Maternal Grandmother      Cerebrovascular Disease Maternal Grandfather      Diabetes Paternal Grandfather      Neurologic Disorder Maternal Aunt      Neurologic Disorder Maternal Aunt      Neurologic Disorder Maternal Uncle      Neurologic Disorder Brother      Genetic Disorder Daughter         cystic fibrosis     Anxiety Disorder Son      Mental Illness Son      Depression Son      Genetic Disorder Son         Transgender (born female, Cecilia)     Anxiety Disorder Cousin      Mental Illness Other      Depression Other                  Immunizations:     Immunization History   Administered Date(s) Administered     Influenza (IIV3) PF 11/14/2007     Influenza Vaccine IM 3yrs+ 4 Valent IIV4 12/18/2013     TDAP Vaccine  "(Boostrix) 04/01/2019             Allergies:   No Known Allergies          Medications:     Current Outpatient Medications on File Prior to Visit:  lamoTRIgine (LAMICTAL) 100 MG tablet Take 1.5 tablets (150 mg) by mouth 2 times daily   venlafaxine (EFFEXOR-ER) 75 MG TB24 Take 1 tablet (75 mg) by mouth daily     No current facility-administered medications on file prior to visit.          Exam:   /76   Pulse 120   Ht 1.613 m (5' 3.5\")   Wt 83.1 kg (183 lb 4.8 oz)   LMP 03/15/2019   BMI 31.96 kg/m       General: alert and conversational, somewhat emotional   Neck: supple, no palpable thyroid   CV: Normal S1,S2 with RRR no murmurs, rubs or gallops  Resp: Lungs CTA bilaterally with no wheezes or crackles  Abd: Soft, NTND with no organomegaly or masses  MSK: moderate muscular tenseness in bilateral trapezius area, no joint or bony abnormalities, normal muscle bulk, does have good ROM of neck but discomfort on later rotation.    Neuro: Grossly nonfocal, CNII-XII intact bilaterally, reflexes were 2/4 and equal bilaterally.   Skin: No concerning lesions or rashes        Labs:   A1C  Lab Results   Component Value Date    A1C 5.1 08/09/2018     JERMAN 14  PHQ9 = 16           Assessment and Plan:   ASSESSMENT:  This is a 48-year-old female who comes in to establish care and has bipolar affective disorder and does not feel well including headaches, neck pain, urinary frequency, insomnia, and perimenopausal symptoms.    (Z76.89) Encounter to establish care  (primary encounter diagnosis)  Comment: encouraged her to a mammogram, needs a screening colonoscopy- pap was done 2018, will update tetanus and talked about shingrex.  Plan: Mammogram Screening Bilateral, Basic Metabolic         Panel, GASTROENTEROLOGY ADULT REF PROCEDURE         ONLY, TDAP VACCINE (BOOSTRIX)            (F31.9) Bipolar affective disorder, remission status unspecified (H)  Comment: she reports that she has more depressive symptoms - on meds, PHQ9 is " 16.   Plan: per psychiatrist at [facility].    (G44.209) Tension-type headache, not intractable, unspecified chronicity pattern  Comment: question if this is related to her upper neck strain  Plan: PT to help with the muscle strain     (M54.2) Neck pain  Comment: as above - she has moderate muscle spasm of upper trapezius  Plan: PHYSICAL THERAPY REFERRAL            (M54.5,  G89.29) Chronic bilateral low back pain without sciatica  Comment: most likely muscular strain, no neurological syptoms  Plan: PHYSICAL THERAPY REFERRAL            (R35.0) Urinary frequency  Comment: questionable etiology - consider diabetes but A1C was 5.1 in 8/2018, possible infection   Plan: Routine UA with micro reflex to culture, Basic         Metabolic Panel            (G47.00) Insomnia, unspecified type  Comment: this is likely multifactorial  Plan: per psychiatry    (N95.1) Perimenopausal symptoms  Comment: several of her  Symptoms could be perimenopausal  Plan: levonorgestrel-ethinyl estradiol         (AVIANE/ALESSE/LESSINA) 0.1-20 MG-MCG tablet        Will try BCP for 1-2 yrs and then change to HT.    (R63.5) Weight gain  Comment: she is obese with BMI of 31 Hard to address in light of her other problems   Plan: will bring this up again - offer weight loss clinic      RTC: 2 months       I spent a total of 50  minutes face-to-face with the patient during today's office visit.  Over 50% of this time was spent counseling the patient and/or coordinating care regarding headaches, perimenopause symptoms, anxiety, insomnia .  See note for details.  Melania Taylor MD, PhD              Again, thank you for allowing me to participate in the care of your patient.      Sincerely,    Melania Taylor MD PhD

## 2019-04-01 NOTE — PATIENT INSTRUCTIONS
Tetanus today  Get Shingrex at pharmacy  Contact psych for anxiety meds - ? Increase the effexor  Start BCP when get back from [place]  physical therapy for back and neck pain   See me in 2 months

## 2019-04-01 NOTE — NURSING NOTE
Chief Complaint   Patient presents with     Establish Care     Pt c/o weight gain and feeling fatigue.

## 2019-04-01 NOTE — PROGRESS NOTES
[facility]  New Patient Evaluation    Name: April Fischer MRN: [id number]       Age: 48 year old           Chief Complaint:    YOB: 1970       CC:establish care  mutiple issues            HPI and ROS:   HPI:    48 year old female here to establish care.  She has multiple issues - she doesn't feel well.  She feels fatigued and exhausted.  She does not sleep well.  She has nightmares.  Her psychiatrist had given her some sleeping pills but they gave her nightmares. She stopped them after several nights     She also gets frequent headaches.  They are bilateral, she gets nauseated with them, and she has to be in a quiet place.   She also gets headaches with her menses.    She has neck pain is that is bothering her.  She also has low back pain.  She has tried massage and that helps for the moment but does not last.  She is under a lot of stress.  She is also now been using a heating pad.    She is also gained 45 pounds in the last 14 months.  She claims she has not changed her eating habits.  This depresses her.    She has bipolar disorder  and sees a psychiatrist at [facility].  This person sees her about every 3 months and has been regulating her meds.    She feels very anxious. She is on effexor but doesn't feel this helps     She is  [id number]  who gets gyn exams by Dr Rodríguez.  She had a pap last year and was normal.   LMP was 3/14/2019 - gets HA 2 days before menses  and bleeds 1 day - fairly light.  -more like  spotting - happening every  few months, getting some night sweats, feels her memory is going -   Has poor concentration.      She also urinates 10-12 times/day      ROS:Answers for HPI/ROS submitted by the patient on 3/31/2019   JERMAN 7 TOTAL SCORE: 14  If you checked off any problems, how difficult have these problems made it for you to do your work, take care of things at home, or get along with other people?: Extremely difficult  PHQ9 TOTAL SCORE: 15  General  Symptoms: Yes  Skin Symptoms: No  HENT Symptoms: Yes  EYE SYMPTOMS: No  HEART SYMPTOMS: No  LUNG SYMPTOMS: No  INTESTINAL SYMPTOMS: Yes  URINARY SYMPTOMS: Yes  GYNECOLOGIC SYMPTOMS: Yes  BREAST SYMPTOMS: No  SKELETAL SYMPTOMS: Yes  BLOOD SYMPTOMS: No  NERVOUS SYSTEM SYMPTOMS: Yes  MENTAL HEALTH SYMPTOMS: Yes  Fever: No  Loss of appetite: No  Weight loss: No  Weight gain: Yes  Fatigue: Yes  Night sweats: Yes  Chills: No  Increased stress: Yes  Excessive hunger: No  Excessive thirst: No  Feeling hot or cold when others believe the temperature is normal: Yes  Loss of height: No  Post-operative complications: No  Surgical site pain: No  Hallucinations: No  Change in or Loss of Energy: Yes  Hyperactivity: No  Confusion: No  Ear pain: No  Ear discharge: No  Hearing loss: No  Tinnitus: No  Nosebleeds: Yes  Congestion: No  Sinus pain: No  Trouble swallowing: No   Voice hoarseness: No  Mouth sores: No  Sore throat: No  Tooth pain: No  Gum tenderness: No  Bleeding gums: No  Change in taste: No  Change in sense of smell: No  Dry mouth: Yes  Hearing aid used: No  Neck lump: No  Heart burn or indigestion: No  Nausea: No  Vomiting: No  Abdominal pain: No  Bloating: Yes  Constipation: Yes  Diarrhea: No  Blood in stool: No  Black stools: No  Rectal or Anal pain: No  Fecal incontinence: No  Yellowing of skin or eyes: No  Vomit with blood: No  Change in stools: Yes  Trouble holding urine or incontinence: Yes  Pain or burning: No  Trouble starting or stopping: No  Increased frequency of urination: Yes  Blood in urine: No  Decreased frequency of urination: No  Frequent nighttime urination: Yes  Flank pain: No  Difficulty emptying bladder: No  Back pain: Yes  Muscle aches: Yes  Neck pain: Yes  Swollen joints: No  Joint pain: Yes  Bone pain: No  Muscle cramps: Yes  Muscle weakness: No  Joint stiffness: No  Bone fracture: No  Trouble with coordination: No  Dizziness or trouble with balance: No  Fainting or black-out spells: No  Memory  "loss: No  Headache: Yes  Seizures: No  Speech problems: No  Tingling: No  Tremor: No  Weakness: No  Difficulty walking: No  Paralysis: No  Numbness: No  Bleeding or spotting between periods: Yes  Heavy or painful periods: No  Irregular periods: Yes  Vaginal discharge: No  Hot flashes: Yes  Vaginal dryness: No  Genital ulcers: No  Reduced libido: No  Painful intercourse: No  Difficulty with sexual arousal: No  Post-menopausal bleeding: No  Nervous or Anxious: Yes  Depression: Yes  Trouble sleeping: Yes  Trouble thinking or concentrating: Yes  Mood changes: Yes  Panic attacks: No             Past Medical History:     Past Medical History:   Diagnosis Date     Adjustment disorder with mixed anxiety and depressed mood     meds \"[facility]\" on meds in past. (unsure type rx) Stopped4/06 declines restart.     Bipolar 2 disorder, major depressive episode (H)      Perimenopause      Weight gain              Past Surgical History:      Past Surgical History:   Procedure Laterality Date     NO HISTORY OF SURGERY               Social History:     Social History     Socioeconomic History     Marital status:      Spouse name: Not on file     Number of children: 3     Years of education: Not on file     Highest education level: Not on file   Occupational History     Employer: SELF   Social Needs     Financial resource strain: Not on file     Food insecurity:     Worry: Not on file     Inability: Not on file     Transportation needs:     Medical: Not on file     Non-medical: Not on file   Tobacco Use     Smoking status: Never Smoker     Smokeless tobacco: Never Used   Substance and Sexual Activity     Alcohol use: Yes     Alcohol/week: 0.0 oz     Comment: 1-2 beers per week if anything     Drug use: No     Sexual activity: Yes     Partners: Male     Birth control/protection: Male Surgical   Lifestyle     Physical activity:     Days per week: 5 days     Minutes per session: 30 min     Stress: Very much   Relationships     " Social connections:     Talks on phone: Not on file     Gets together: Not on file     Attends Restorationism service: Not on file     Active member of club or organization: Not on file     Attends meetings of clubs or organizations: Not on file     Relationship status: Not on file     Intimate partner violence:     Fear of current or ex partner: Not on file     Emotionally abused: Not on file     Physically abused: Not on file     Forced sexual activity: Not on file   Other Topics Concern     Parent/sibling w/ CABG, MI or angioplasty before 65F 55M? Not Asked   Social History Narrative    , has an 17 yo with cystic fibrosis - all going to [place] next week as a gift to this 19yo from the [employer].   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    Melania Taylor MD, PhD              Family History:     Family History   Problem Relation Age of Onset     Psychotic Disorder Mother         Many family members with depression.     Hypertension Maternal Grandmother      Breast Cancer Maternal Grandmother      Neurologic Disorder Maternal Grandmother      Anxiety Disorder Maternal Grandmother         MANY family members deal with anxiety     Mental Illness Maternal Grandmother         MANY family members     Depression Maternal Grandmother      Cerebrovascular Disease Maternal Grandfather      Diabetes Paternal Grandfather      Neurologic Disorder Maternal Aunt      Neurologic Disorder Maternal Aunt      Neurologic Disorder Maternal Uncle      Neurologic Disorder Brother      Genetic Disorder Daughter         cystic fibrosis     Anxiety Disorder Son      Mental Illness Son      Depression Son      Genetic Disorder Son         Transgender (born female, Cecilia)     Anxiety Disorder Cousin      Mental Illness Other      Depression Other                  Immunizations:     Immunization History   Administered Date(s) Administered     Influenza (IIV3) PF 11/14/2007     Influenza Vaccine IM 3yrs+ 4 Valent IIV4  "12/18/2013     TDAP Vaccine (Boostrix) 04/01/2019             Allergies:   No Known Allergies          Medications:     Current Outpatient Medications on File Prior to Visit:  lamoTRIgine (LAMICTAL) 100 MG tablet Take 1.5 tablets (150 mg) by mouth 2 times daily   venlafaxine (EFFEXOR-ER) 75 MG TB24 Take 1 tablet (75 mg) by mouth daily     No current facility-administered medications on file prior to visit.          Exam:   /76   Pulse 120   Ht 1.613 m (5' 3.5\")   Wt 83.1 kg (183 lb 4.8 oz)   LMP 03/15/2019   BMI 31.96 kg/m      General: alert and conversational, somewhat emotional   Neck: supple, no palpable thyroid   CV: Normal S1,S2 with RRR no murmurs, rubs or gallops  Resp: Lungs CTA bilaterally with no wheezes or crackles  Abd: Soft, NTND with no organomegaly or masses  MSK: moderate muscular tenseness in bilateral trapezius area, no joint or bony abnormalities, normal muscle bulk, does have good ROM of neck but discomfort on later rotation.    Neuro: Grossly nonfocal, CNII-XII intact bilaterally, reflexes were 2/4 and equal bilaterally.   Skin: No concerning lesions or rashes        Labs:   A1C  Lab Results   Component Value Date    A1C 5.1 08/09/2018     JERMAN 14  PHQ9 = 16           Assessment and Plan:   ASSESSMENT:  This is a 48-year-old female who comes in to establish care and has bipolar affective disorder and does not feel well including headaches, neck pain, urinary frequency, insomnia, and perimenopausal symptoms.    (Z76.89) Encounter to establish care  (primary encounter diagnosis)  Comment: encouraged her to a mammogram, needs a screening colonoscopy- pap was done 2018, will update tetanus and talked about shingrex.  Plan: Mammogram Screening Bilateral, Basic Metabolic         Panel, GASTROENTEROLOGY ADULT REF PROCEDURE         ONLY, TDAP VACCINE (BOOSTRIX)            (F31.9) Bipolar affective disorder, remission status unspecified (H)  Comment: she reports that she has more depressive " symptoms - on meds, PHQ9 is 16.   Plan[facility].    (G44.209) Tension-type headache, not intractable, unspecified chronicity pattern  Comment: question if this is related to her upper neck strain  Plan: PT to help with the muscle strain     (M54.2) Neck pain  Comment: as above - she has moderate muscle spasm of upper trapezius  Plan: PHYSICAL THERAPY REFERRAL            (M54.5,  G89.29) Chronic bilateral low back pain without sciatica  Comment: most likely muscular strain, no neurological syptoms  Plan: PHYSICAL THERAPY REFERRAL            (R35.0) Urinary frequency  Comment: questionable etiology - consider diabetes but A1C was 5.1 in 8/2018, possible infection   Plan: Routine UA with micro reflex to culture, Basic         Metabolic Panel            (G47.00) Insomnia, unspecified type  Comment: this is likely multifactorial  Plan: per psychiatry    (N95.1) Perimenopausal symptoms  Comment: several of her  Symptoms could be perimenopausal  Plan: levonorgestrel-ethinyl estradiol         (AVIANE/ALESSE/LESSINA) 0.1-20 MG-MCG tablet        Will try BCP for 1-2 yrs and then change to HT.    (R63.5) Weight gain  Comment: she is obese with BMI of 31 Hard to address in light of her other problems   Plan: will bring this up again - offer weight loss clinic      RTC: 2 months       I spent a total of 50  minutes face-to-face with the patient during today's office visit.  Over 50% of this time was spent counseling the patient and/or coordinating care regarding headaches, perimenopause symptoms, anxiety, insomnia .  See note for details.  Melania Taylor MD, PhD

## 2019-04-01 NOTE — LETTER
Date:April 4, 2019      Patient was self referred, no letter generated. Do not send.        [facility] Health Information

## 2019-04-02 ENCOUNTER — TELEPHONE (OUTPATIENT)
Dept: GASTROENTEROLOGY | Facility: CLINIC | Age: 49
End: 2019-04-02

## 2019-04-12 ENCOUNTER — TELEPHONE (OUTPATIENT)
Dept: GASTROENTEROLOGY | Facility: OUTPATIENT CENTER | Age: 49
End: 2019-04-12

## 2019-04-15 ENCOUNTER — TELEPHONE (OUTPATIENT)
Dept: GASTROENTEROLOGY | Facility: OUTPATIENT CENTER | Age: 49
End: 2019-04-15

## 2019-04-17 ENCOUNTER — TELEPHONE (OUTPATIENT)
Dept: GASTROENTEROLOGY | Facility: OUTPATIENT CENTER | Age: 49
End: 2019-04-17

## 2019-04-19 ENCOUNTER — THERAPY VISIT (OUTPATIENT)
Dept: PHYSICAL THERAPY | Facility: CLINIC | Age: 49
End: 2019-04-19
Attending: FAMILY MEDICINE
Payer: COMMERCIAL

## 2019-04-19 ENCOUNTER — TRANSFERRED RECORDS (OUTPATIENT)
Dept: HEALTH INFORMATION MANAGEMENT | Facility: CLINIC | Age: 49
End: 2019-04-19

## 2019-04-19 DIAGNOSIS — M54.2 NECK PAIN: ICD-10-CM

## 2019-04-19 DIAGNOSIS — G89.29 CHRONIC BILATERAL LOW BACK PAIN WITHOUT SCIATICA: ICD-10-CM

## 2019-04-19 DIAGNOSIS — M54.50 CHRONIC BILATERAL LOW BACK PAIN WITHOUT SCIATICA: ICD-10-CM

## 2019-04-19 DIAGNOSIS — M54.2 CERVICAL PAIN: ICD-10-CM

## 2019-04-19 PROCEDURE — 97110 THERAPEUTIC EXERCISES: CPT | Mod: GP | Performed by: PHYSICAL THERAPIST

## 2019-04-19 PROCEDURE — 97161 PT EVAL LOW COMPLEX 20 MIN: CPT | Mod: GP | Performed by: PHYSICAL THERAPIST

## 2019-04-19 NOTE — PROGRESS NOTES
[facility] Initial Evaluation -- Cervical    Evaluation Date: April 19, 2019  April Fischer is a 48 year old female with a cervical condition.   Referral: Dr. 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  Functional disability score (NDI):  See Chart  VAS score (0-10): 7/10   Patient goals/expectations:  Reduce pain without having to take medication.     HISTORY:    Present symptoms:  Bilateral neck / upper back.  Pain quality (sharp/shooting/stabbing/aching/burning/cramping):   Aching pain.  Paresthesia (yes/no):  no    Present since (onset date): 2 years ago. 4/1/19 date of MD order.     Symptoms (improving/unchanging/worsening):  unchanging.    Symptoms commenced as a result of: gradual onset, unknown.    Condition occurred in the following environment:  unknown    Symptoms at onset (neck/arm/forearm/headache): Neck, thoracic spine  Constant symptoms (neck/arm/forearm/headache): neck , upper back  Intermittent symptoms (neck/arm/forearm/headache): low back     Symptoms are made worse with the following: sitting in studio   Symptoms are made better with the following: has Tried a bunch of creames, sleeping in different position, with 0 relief, Nothing has helped.     Disturbed sleep (yes/no): yes  Number of pillows: 1-2 pillows, tried different positions with no change.   Sleeping postures (prone/sup/side R/L): right SL but always end up on her back.     Previous episodes (0/1-5/6-10/11+): 0 Year of first episode: started 2 years ago but has been constant since.     Previous history: This is the only episode of pain   Previous treatments: massages / cupping, ti massage which have all felt good but gets horrible headache after.     Specific Questions: (as reported by the patient)  Dizziness/Tinnitus/Nausea/Swallowing (pos/neg): neg  Gait/Upper Limbs  (normal/abnormal): normal  Medications (nil/NSAIDS/anlag/steroids/anticoag/other):  Other - Anti-depressants  Medical allergies:  No   General health (excellent/good/fair/poor):  good  Pertinent medical history:  Mental illness and Migraines/Headaches  Imaging (None/Xray/MRI/Other):  none  Recent or major surgery (yes/no): no  Night pain (yes/no): yes, alter her pain with position  Accidents (yes/no): no  Unexplained weight loss (yes/no): no  Barriers at home: no  Other red flags: no    EXAMINATION    Posture:   Sitting (good/fair/poor): fair  Standing (good/fair/poor): fair     Protruded head (yes/no): no     Wry Neck (right/left/nil):  no  Relevant (yes/no):  no     Correction of posture(better/worse/no effect): no effect  Other observations:  None     Neurological:    Motor Deficit:  Normal strength 5/5   Reflexes:  normal  Sensory Deficit:  Pt reports no numbness/ tingling   Dural signs:  Negative dural signs.     Movement Loss:   Alf Mod Min Nil Pain   Protrusion   x  Bilateral UT pain   Flexion    x Bilateral neck pain   Retraction    x Bilateral UT   Extension  x   Bilatera UT pain   Lateral flexion R   x  Left UT pain   Lateral flexion L   x  Left UT pain   Rotation R    x UT Pain   Rotation L    x UT pain     Test Movements:   During: produces, abolishes, increases, decreases, no effect, centralizing, peripheralizing  After: better, worse, no better, no worse, no effect, centralized, peripheralized    Pretest symptoms sittin/10 cervical pain , bilateral Upper trap   Symptoms During Symptoms After ROM increased ROM decreased No Effect   PRO          Rep PRO No Effect    No Effect         RET Increases    No Worse         Rep RET Increases    No Worse         RET EXT Increases    No Worse         Rep RET EXT Increases    No Worse           I  FLEX Increases    No Worse         Rep FLEX Increases    No Worse             Provisional Classification:  Inconclusive/Other - chronic pain.     Principle of  Management:  Education:  Avoid protrusion well at work    Equipment provided:  none  Mechanical therapy (Y/N):  y   Extension principle:  Retraction x 10 reps every 2 hours 6-8 x/ day. Progress force if no better no worse   ASSESSMENT/PLAN:    Patient is a 48 year old female with cervical complaints.    Patient has the following significant findings with corresponding treatment plan.                Diagnosis 1:  Chronic cervical pain  Pain -  hot/cold therapy, US, electric stimulation, mechanical traction, manual therapy, self management, education, directional preference exercise and home program  Decreased ROM/flexibility - manual therapy, therapeutic exercise, therapeutic activity and home program  Decreased function - therapeutic activities and home program  Impaired posture - neuro re-education, therapeutic activities and home program    Therapy Evaluation Codes:   1) History comprised of:   Personal factors that impact the plan of care:      Anxiety, Coping style and Time since onset of symptoms.    Comorbidity factors that impact the plan of care are:      Mental illness and Migraines/headaches.     Medications impacting care: Anti-depressant.  2) Examination of Body Systems comprised of:   Body structures and functions that impact the plan of care:      Cervical spine.   Activity limitations that impact the plan of care are:      Sitting, Working and Sleeping.  3) Clinical presentation characteristics are:   Evolving/Changing.  4) Decision-Making    Moderate complexity using standardized patient assessment instrument and/or measureable assessment of functional outcome.  Cumulative Therapy Evaluation is: Moderate complexity.    Previous and current functional limitations:  (See Goal Flow Sheet for this information)    Short term and Long term goals: (See Goal Flow Sheet for this information)     Communication ability:  Patient appears to be able to clearly communicate and understand verbal and written  communication and follow directions correctly.  Treatment Explanation - The following has been discussed with the patient:   RX ordered/plan of care  Anticipated outcomes  Possible risks and side effects  This patient would benefit from PT intervention to resume normal activities.   Rehab potential is fair.    Frequency:  1 X week, once daily  Duration:  for 8 weeks  Discharge Plan:  Achieve all LTG.  Independent in home treatment program.  Reach maximal therapeutic benefit.    Please refer to the daily flowsheet for treatment today, total treatment time and time spent performing 1:1 timed codes.

## 2019-04-26 ENCOUNTER — TELEPHONE (OUTPATIENT)
Dept: GASTROENTEROLOGY | Facility: CLINIC | Age: 49
End: 2019-04-26

## 2019-04-26 ENCOUNTER — THERAPY VISIT (OUTPATIENT)
Dept: PHYSICAL THERAPY | Facility: CLINIC | Age: 49
End: 2019-04-26
Attending: FAMILY MEDICINE
Payer: COMMERCIAL

## 2019-04-26 DIAGNOSIS — M54.2 CERVICAL PAIN: ICD-10-CM

## 2019-04-26 PROCEDURE — 97530 THERAPEUTIC ACTIVITIES: CPT | Mod: GP | Performed by: PHYSICAL THERAPIST

## 2019-04-26 PROCEDURE — 97110 THERAPEUTIC EXERCISES: CPT | Mod: GP | Performed by: PHYSICAL THERAPIST

## 2019-04-26 NOTE — TELEPHONE ENCOUNTER
VM requesting patient call to reschedule Colonoscopy at her earliest convenience.  Telephone number of [phone] provided for today.     Romelia Andrews RN  [facility] Endoscopy

## 2019-04-26 NOTE — TELEPHONE ENCOUNTER
Patient called upset stating she was transferred several times and then back in the que to cancel her appointment (this writer is unsure of where patient's phone call originated from).  Patient states she is extremely frustrated because she has not heard from the nurse to get her prep prescription.  Informed patient that Dr. Mayer's prep is over the counter.  In addition, explained to the patient that Briseida Augustine RN sent her a [employer] message on 4/17/19 informing her we could not leave a message as her voicemail box was full.  Patient continued to get argumentative asking why would we send a message to her [employer]?  I explained to patient that this is the [facility] and that we cannot send a [employer] message to her [employer] account.  Patient became even louder and even more argumentative.  Patient was transferred to Endoscopy supervisor.  Appointment has been cancelled.

## 2019-04-26 NOTE — TELEPHONE ENCOUNTER
Patient's  called to cancel patient's procedure.  Informed patient's  that patient had just called to cancel her appointment.

## 2019-05-10 ENCOUNTER — THERAPY VISIT (OUTPATIENT)
Dept: PHYSICAL THERAPY | Facility: CLINIC | Age: 49
End: 2019-05-10
Payer: COMMERCIAL

## 2019-05-10 DIAGNOSIS — M54.2 CERVICAL PAIN: ICD-10-CM

## 2019-05-10 PROCEDURE — 97110 THERAPEUTIC EXERCISES: CPT | Mod: GP | Performed by: PHYSICAL THERAPIST

## 2019-05-10 PROCEDURE — 97140 MANUAL THERAPY 1/> REGIONS: CPT | Mod: GP | Performed by: PHYSICAL THERAPIST

## 2019-05-17 ENCOUNTER — THERAPY VISIT (OUTPATIENT)
Dept: PHYSICAL THERAPY | Facility: CLINIC | Age: 49
End: 2019-05-17
Payer: COMMERCIAL

## 2019-05-17 DIAGNOSIS — M54.2 CERVICAL PAIN: ICD-10-CM

## 2019-05-17 PROCEDURE — 97140 MANUAL THERAPY 1/> REGIONS: CPT | Mod: GP | Performed by: PHYSICAL THERAPIST

## 2019-05-17 PROCEDURE — 97110 THERAPEUTIC EXERCISES: CPT | Mod: GP | Performed by: PHYSICAL THERAPIST

## 2019-05-24 ENCOUNTER — THERAPY VISIT (OUTPATIENT)
Dept: PHYSICAL THERAPY | Facility: CLINIC | Age: 49
End: 2019-05-24
Payer: COMMERCIAL

## 2019-05-24 DIAGNOSIS — M54.2 CERVICAL PAIN: ICD-10-CM

## 2019-05-24 PROCEDURE — 97140 MANUAL THERAPY 1/> REGIONS: CPT | Mod: GP | Performed by: PHYSICAL THERAPIST

## 2019-05-24 PROCEDURE — 97110 THERAPEUTIC EXERCISES: CPT | Mod: GP | Performed by: PHYSICAL THERAPIST

## 2019-05-31 ENCOUNTER — THERAPY VISIT (OUTPATIENT)
Dept: PHYSICAL THERAPY | Facility: CLINIC | Age: 49
End: 2019-05-31
Payer: COMMERCIAL

## 2019-05-31 DIAGNOSIS — M54.2 CERVICAL PAIN: ICD-10-CM

## 2019-05-31 PROCEDURE — 97110 THERAPEUTIC EXERCISES: CPT | Mod: GP | Performed by: PHYSICAL THERAPIST

## 2019-05-31 PROCEDURE — 97140 MANUAL THERAPY 1/> REGIONS: CPT | Mod: GP | Performed by: PHYSICAL THERAPIST

## 2019-06-07 ENCOUNTER — THERAPY VISIT (OUTPATIENT)
Dept: PHYSICAL THERAPY | Facility: CLINIC | Age: 49
End: 2019-06-07
Payer: COMMERCIAL

## 2019-06-07 DIAGNOSIS — M54.2 CERVICAL PAIN: ICD-10-CM

## 2019-06-07 PROCEDURE — 97140 MANUAL THERAPY 1/> REGIONS: CPT | Mod: GP | Performed by: PHYSICAL THERAPY ASSISTANT

## 2019-06-07 PROCEDURE — 97110 THERAPEUTIC EXERCISES: CPT | Mod: GP | Performed by: PHYSICAL THERAPY ASSISTANT

## 2019-06-14 ENCOUNTER — THERAPY VISIT (OUTPATIENT)
Dept: PHYSICAL THERAPY | Facility: CLINIC | Age: 49
End: 2019-06-14
Payer: COMMERCIAL

## 2019-06-14 DIAGNOSIS — M54.2 CERVICAL PAIN: ICD-10-CM

## 2019-06-14 PROCEDURE — 97110 THERAPEUTIC EXERCISES: CPT | Mod: GP | Performed by: PHYSICAL THERAPIST

## 2019-06-14 PROCEDURE — 97140 MANUAL THERAPY 1/> REGIONS: CPT | Mod: GP | Performed by: PHYSICAL THERAPIST

## 2019-06-14 NOTE — LETTER
[facility] PHYSICAL THERAPY  [address]  [facility] [address]  [phone]    2019    Re: April Fischer   :   1970  MRN:  [id number]   REFERRING PHYSICIAN:   Melania Taylor    [facility] PHYSICAL THERAPY    Date of Initial Evaluation:  2019  Visits: 8 Rxs Used: 8  Reason for Referral:  Cervical pain    PROGRESS  REPORT    Progress reporting period is from 2019 to 2019.       SUBJECTIVE  Patient relates that she has had a bad week.  She has had increased pain on the R side of the neck into the upper trap area and down into the scapula.  Symptoms are worse on the R vs the L.  Has used heat, self massage unit and has tried exercises without relief in symptoms.  Due to the more intense symptoms she has felt somewhat nauseated.  Not sure why the symptoms are not remaining better as she has had some good weeks in May.     Current Pain level: 7/10(8/10).     Initial Pain level: 7/10.   Changes in function:  Yes, sleep has been better, however, no change over the past 2 weeks. ROM has improved.  Adverse reaction to treatment or activity: None    OBJECTIVE  Changes noted in objective findings:  Yes, improved neck ROM    Objective:   CROM   Ext 45 deg, Rot R 60 deg, Rot L 50 deg, side bending R 40 deg, side bending L 27 deg.    Continues to have pain during motion.  ROM and pain will improve after manual work and exercise.  No increase in symptoms with trial of upper back and RC strengthening.   Good sitting posture.      ASSESSMENT/PLAN  Updated problem list and treatment plan: Diagnosis 1:  Chronic neck pain    Pain -  manual therapy, self management, education, directional preference exercise and home program  Decreased ROM/flexibility - manual therapy, therapeutic exercise and home program  Decreased joint mobility - manual therapy, therapeutic exercise and home program  Impaired muscle performance - neuro re-education and home  program  Decreased function - therapeutic activities and home program  Re: April Fischer   :   1970  STG/LTGs have been met or progress has been made towards goals:  Yes (See Goal flow sheet completed today.)  Assessment of Progress: The patient's condition has potential to improve.  Self Management Plans:  Patient has been instructed in a home treatment program.  Patient  has been instructed in self management of symptoms.  I have re-evaluated this patient and find that the nature, scope, duration and intensity of the therapy is appropriate for the medical condition of the patient.  April continues to require the following intervention to meet STG and LTG's:  PT    Recommendations:  This patient would benefit from continued therapy.     Frequency:  1 X week, once daily  Duration:  for 8 visits    Thank you for your referral.    INQUIRIES  Therapist: Garret Dinh, PT  [facility] PHYSICAL THERAPY  [address]  [facility] [address]  Phone: [phone]  Fax: [phone]

## 2019-06-14 NOTE — PROGRESS NOTES
Subjective:  HPI                    Objective:  System    Physical Exam    General     ROS    Assessment/Plan:    PROGRESS  REPORT    Progress reporting period is from 4/19/2019 to 6/14/2019.       SUBJECTIVE  Patient relates that she has had a bad week.  She has had increased pain on the R side of the neck into the upper trap area and down into the scapula.  Symptoms are worse on the R vs the L.  Has used heat, self massage unit and has tried exercises without relief in symptoms.  Due to the more intense symptoms she has felt somewhat nauseated.  Not sure why the symptoms are not remaining better as she has had some good weeks in May.     Current Pain level: 7/10(8/10).     Initial Pain level: 7/10.   Changes in function:  Yes, sleep has been better, however, no change over the past 2 weeks. ROM has improved.  Adverse reaction to treatment or activity: None    OBJECTIVE  Changes noted in objective findings:  Yes, improved neck ROM    Objective:   CROM   Ext 45 deg, Rot R 60 deg, Rot L 50 deg, side bending R 40 deg, side bending L 27 deg.    Continues to have pain during motion.  ROM and pain will improve after manual work and exercise.  No increase in symptoms with trial of upper back and RC strengthening.   Good sitting posture.      ASSESSMENT/PLAN  Updated problem list and treatment plan: Diagnosis 1:  Chronic neck pain    Pain -  manual therapy, self management, education, directional preference exercise and home program  Decreased ROM/flexibility - manual therapy, therapeutic exercise and home program  Decreased joint mobility - manual therapy, therapeutic exercise and home program  Impaired muscle performance - neuro re-education and home program  Decreased function - therapeutic activities and home program  STG/LTGs have been met or progress has been made towards goals:  Yes (See Goal flow sheet completed today.)  Assessment of Progress: The patient's condition has potential to improve.  Self Management  Plans:  Patient has been instructed in a home treatment program.  Patient  has been instructed in self management of symptoms.  I have re-evaluated this patient and find that the nature, scope, duration and intensity of the therapy is appropriate for the medical condition of the patient.  April continues to require the following intervention to meet STG and LTG's:  PT    Recommendations:  This patient would benefit from continued therapy.     Frequency:  1 X week, once daily  Duration:  for 8 visits        Please refer to the daily flowsheet for treatment today, total treatment time and time spent performing 1:1 timed codes.

## 2019-06-21 ENCOUNTER — THERAPY VISIT (OUTPATIENT)
Dept: PHYSICAL THERAPY | Facility: CLINIC | Age: 49
End: 2019-06-21
Payer: COMMERCIAL

## 2019-06-21 DIAGNOSIS — M54.2 CERVICAL PAIN: ICD-10-CM

## 2019-06-21 PROCEDURE — 97140 MANUAL THERAPY 1/> REGIONS: CPT | Mod: GP | Performed by: PHYSICAL THERAPIST

## 2019-06-21 PROCEDURE — 97110 THERAPEUTIC EXERCISES: CPT | Mod: GP | Performed by: PHYSICAL THERAPIST

## 2019-07-02 ENCOUNTER — THERAPY VISIT (OUTPATIENT)
Dept: PHYSICAL THERAPY | Facility: CLINIC | Age: 49
End: 2019-07-02
Payer: COMMERCIAL

## 2019-07-02 DIAGNOSIS — M54.2 CERVICAL PAIN: ICD-10-CM

## 2019-07-02 PROCEDURE — 97140 MANUAL THERAPY 1/> REGIONS: CPT | Mod: GP | Performed by: PHYSICAL THERAPIST

## 2019-07-02 PROCEDURE — 97110 THERAPEUTIC EXERCISES: CPT | Mod: GP | Performed by: PHYSICAL THERAPIST

## 2019-07-15 ENCOUNTER — THERAPY VISIT (OUTPATIENT)
Dept: PHYSICAL THERAPY | Facility: CLINIC | Age: 49
End: 2019-07-15
Payer: COMMERCIAL

## 2019-07-15 DIAGNOSIS — M54.2 CERVICAL PAIN: ICD-10-CM

## 2019-07-15 PROCEDURE — 97110 THERAPEUTIC EXERCISES: CPT | Mod: GP | Performed by: PHYSICAL THERAPIST

## 2019-07-15 PROCEDURE — 97140 MANUAL THERAPY 1/> REGIONS: CPT | Mod: GP | Performed by: PHYSICAL THERAPIST

## 2019-08-08 ENCOUNTER — THERAPY VISIT (OUTPATIENT)
Dept: PHYSICAL THERAPY | Facility: CLINIC | Age: 49
End: 2019-08-08
Payer: COMMERCIAL

## 2019-08-08 DIAGNOSIS — M54.2 CERVICAL PAIN: ICD-10-CM

## 2019-08-08 PROCEDURE — 97140 MANUAL THERAPY 1/> REGIONS: CPT | Mod: GP | Performed by: PHYSICAL THERAPIST

## 2019-08-08 PROCEDURE — 97110 THERAPEUTIC EXERCISES: CPT | Mod: GP | Performed by: PHYSICAL THERAPIST

## 2019-08-09 NOTE — PROGRESS NOTES
Subjective:  HPI                    Objective:  System    Physical Exam    General     ROS    Assessment/Plan:    PROGRESS  REPORT    Progress reporting period is from 6/14/2019 to 8/8/2019.       SUBJECTIVE  Patient relates that she has been doing fairly well up until 8/2/19.  Not sure why the symptoms flared, but they have been constant since then.  Symptoms worse on the L vs R in the lower neck though the mid back.  Has been consistent with exercise, heat and massager which help but not alleviating the symptoms.  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  Has been utilizing proper posture and body mechanics with every day activity.   Sleeping continues to be improved but has been having more difficulty falling back to sleep after wakes due to symptoms a couple of times this past week.  Usually losing less than an hour of sleep due to symptoms.    Current pain level is 6/10 Current Pain level: 6/10.     Previous pain level was  7/10 - 8/10  Initial Pain level: 7/10.   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  Has had to lift more with her increased activity.    Adverse reaction to treatment or activity: activity - without specific reason for increased symptoms.  Notes she needs to be consistent with her home program even when feeling good.  Has not made it to the gym.      OBJECTIVE  Changes noted in objective findings:  Yes, improved posture, improved neck ROM  Objective:   CROM mod loss extension (50 deg) , min/mod loss SB L (30 deg), R SB 40 deg, and rotation R (60 deg) L Rot 65 deg.    Pain increases on left with all CROM    6/14/2019 CROM  Ext 45 deg, Rot R 60 deg, Rot L 50 deg, side bending R 40 deg, side bending L 27 deg.    Stiffness/decreased joint play with PA mobilization throughout the lower neck and mid back.    Patient will have a decrease in pain with repeated neck retraction extension with self  overpressure and mobilization to the mid back through the lower cervical spine.    ASSESSMENT/PLAN  Updated problem list and treatment plan: Diagnosis 1:  Chronic neck and upper back pain    Pain -  manual therapy, self management, education, directional preference exercise and home program  Decreased ROM/flexibility - manual therapy, therapeutic exercise and home program  Decreased joint mobility - manual therapy, therapeutic exercise and home program  Decreased function - therapeutic activities and home program  Impaired posture - neuro re-education, therapeutic activities and home program  STG/LTGs have been met or progress has been made towards goals:  Yes (See Goal flow sheet completed today.)  Assessment of Progress: The patient's condition is improving.  The patient's condition has potential to improve.  Self Management Plans:  Patient has been instructed in a home treatment program.  Patient  has been instructed in self management of symptoms.  I have re-evaluated this patient and find that the nature, scope, duration and intensity of the therapy is appropriate for the medical condition of the patient.  April continues to require the following intervention to meet STG and LTG's:  PT    Recommendations:  This patient would benefit from continued therapy.     Frequency:  1 X week, once daily  Duration:  for 2 weeks tapering to every other week over 4 weeks        Please refer to the daily flowsheet for treatment today, total treatment time and time spent performing 1:1 timed codes.

## 2019-08-15 ENCOUNTER — THERAPY VISIT (OUTPATIENT)
Dept: PHYSICAL THERAPY | Facility: CLINIC | Age: 49
End: 2019-08-15
Payer: COMMERCIAL

## 2019-08-15 DIAGNOSIS — M54.2 CERVICAL PAIN: ICD-10-CM

## 2019-08-15 PROCEDURE — 97140 MANUAL THERAPY 1/> REGIONS: CPT | Mod: GP | Performed by: PHYSICAL THERAPIST

## 2019-08-15 PROCEDURE — 97110 THERAPEUTIC EXERCISES: CPT | Mod: GP | Performed by: PHYSICAL THERAPIST

## 2019-09-10 ENCOUNTER — THERAPY VISIT (OUTPATIENT)
Dept: PHYSICAL THERAPY | Facility: CLINIC | Age: 49
End: 2019-09-10
Payer: COMMERCIAL

## 2019-09-10 DIAGNOSIS — M54.2 CERVICAL PAIN: ICD-10-CM

## 2019-09-10 PROCEDURE — 97140 MANUAL THERAPY 1/> REGIONS: CPT | Mod: GP | Performed by: PHYSICAL THERAPIST

## 2019-09-10 PROCEDURE — 97110 THERAPEUTIC EXERCISES: CPT | Mod: GP | Performed by: PHYSICAL THERAPIST

## 2019-10-08 ENCOUNTER — THERAPY VISIT (OUTPATIENT)
Dept: PHYSICAL THERAPY | Facility: CLINIC | Age: 49
End: 2019-10-08
Payer: COMMERCIAL

## 2019-10-08 DIAGNOSIS — M54.2 CERVICAL PAIN: ICD-10-CM

## 2019-10-08 PROCEDURE — 97110 THERAPEUTIC EXERCISES: CPT | Mod: GP | Performed by: PHYSICAL THERAPIST

## 2019-10-08 PROCEDURE — 97140 MANUAL THERAPY 1/> REGIONS: CPT | Mod: GP | Performed by: PHYSICAL THERAPIST

## 2019-10-22 ENCOUNTER — THERAPY VISIT (OUTPATIENT)
Dept: PHYSICAL THERAPY | Facility: CLINIC | Age: 49
End: 2019-10-22
Payer: COMMERCIAL

## 2019-10-22 DIAGNOSIS — M54.2 CERVICAL PAIN: ICD-10-CM

## 2019-10-22 PROCEDURE — 97140 MANUAL THERAPY 1/> REGIONS: CPT | Mod: GP | Performed by: PHYSICAL THERAPIST

## 2019-10-22 PROCEDURE — 97110 THERAPEUTIC EXERCISES: CPT | Mod: GP | Performed by: PHYSICAL THERAPIST

## 2019-10-22 NOTE — PROGRESS NOTES
Subjective:  HPI                    Objective:  System    Physical Exam    General     ROS    Assessment/Plan:    PROGRESS  REPORT    Progress reporting period is from 8/8/2019 to 10/22/2019.       SUBJECTIVE  Patient notes that she had a bad week last week, this week she has been improving.  Notes that she has a constant ache on both sides of the neck, down between her shoulder blades and through the posterior shoulders/upper trap area. She is able to decrease her symptoms with her neck retraction and neck extension exercises.  She has been using her  heating pad and massager more frequently to assist with the pain.  Patient is unable to determine what may have caused the worsening of her symptoms.  She did relate that when she was feeling better, she did hold on her exercises and wonders if that could be a reason why symptoms returned and is taking longer for the symptoms to resolve.      Current Pain level: 4/10.     Previous pain level was  6/10 Initial Pain level: 7/10.   Changes in function:  Sleeping had been better when she was feeling good, now decreased sleep quality/quantity with increased symptoms.   Adverse reaction to treatment or activity: None    OBJECTIVE  Changes noted in objective findings:  Yes, improved ROM, improved posture and function has improved.    Objective:   CROM rotation to 80 deg, B, 50 deg extension. (at last PN Rot R 60 deg, Rot L 50 deg, extension 45 deg)   Extension improved to 62 deg after neck retraction and was less painful     Increased muscle tone on the R side of he upper back, paraspinals; less after manual therapy  Further improvement in neck extension after thoracic extension ROM and manual therapy.    NDI score has improved minimally from 37.78 on April 19, 2019 to 32% today, 10/22/2019.       ASSESSMENT/PLAN  Updated problem list and treatment plan: Diagnosis 1:  Chronic neck and upper back pain    Pain -  manual therapy, self management, education, directional  preference exercise and home program  Decreased ROM/flexibility - manual therapy, therapeutic exercise and home program  Decreased joint mobility - manual therapy, therapeutic exercise and home program  Impaired muscle performance - neuro re-education and home program  Decreased function - therapeutic activities and home program  Impaired posture - neuro re-education, therapeutic activities and home program  STG/LTGs have been met or progress has been made towards goals:  Yes, progress has been made overall, but decreased ability to sleep well with increased symptoms over the past week or so  Assessment of Progress: The patient's condition has potential to improve.  The patient has had set backs in their progress.  Self Management Plans:  Patient has been instructed in a home treatment program.  Patient  has been instructed in self management of symptoms.  I have re-evaluated this patient and find that the nature, scope, duration and intensity of the therapy is appropriate for the medical condition of the patient.  April continues to require the following intervention to meet STG and LTG's:  PT    Recommendations:  This patient would benefit from continued therapy.     Frequency:  2 X a month, once daily  Duration:  for 2 months        Please refer to the daily flowsheet for treatment today, total treatment time and time spent performing 1:1 timed codes.

## 2019-10-22 NOTE — LETTER
[facility] PHYS[facility]  [address]  [facility] [address]  [phone]    2019    Re: April Fischer   :   1970  MRN:  [id number]   REFERRING PHYSICIAN:   Melania Taylor    [facility]  Date of Initial Evaluation:  19  Visits:  Rxs Used: 16  Reason for Referral:  Cervical pain    PROGRESS  REPORT  Progress reporting period is from 2019 to 10/22/2019.       SUBJECTIVE  Patient notes that she had a bad week last week, this week she has been improving.  Notes that she has a constant ache on both sides of the neck, down between her shoulder blades and through the posterior shoulders/upper trap area. She is able to decrease her symptoms with her neck retraction and neck extension exercises.  She has been using her  heating pad and massager more frequently to assist with the pain.    Patient is unable to determine what may have caused the worsening of her symptoms.  She did relate that when she was feeling better, she did hold on her exercises and wonders if that could be a reason why symptoms returned and is taking longer for the symptoms to resolve.        Current Pain level: 4/10.     Previous pain level was  6/10 Initial Pain level: 7/10.   Changes in function:  Sleeping had been better when she was feeling good, now decreased sleep quality/quantity with increased symptoms.   Adverse reaction to treatment or activity: None    OBJECTIVE  Changes noted in objective findings:  Yes, improved ROM, improved posture and function has improved.    Objective:   CROM rotation to 80 deg, B, 50 deg extension. (at last PN Rot R 60 deg, Rot L 50 deg, extension 45 deg)     Extension improved to 62 deg after neck retraction and was less painful     Increased muscle tone on the R side of he upper back, paraspinals; less after manual therapy    Further improvement in neck extension after thoracic extension ROM and manual  therapy.      NDI score has improved minimally from 37.78 on April 19, 2019 to 32% today, 10/22/2019.     ASSESSMENT/PLAN  Updated problem list and treatment plan:     Diagnosis 1:  Chronic neck and upper back pain    Pain -  manual therapy, self management, education, directional preference exercise and home program    Decreased ROM/flexibility - manual therapy, therapeutic exercise and home program  Decreased joint mobility - manual therapy, therapeutic exercise and home program  Impaired muscle performance - neuro re-education and home program  Decreased function - therapeutic activities and home program  Impaired posture - neuro re-education, therapeutic activities and home program    STG/LTGs have been met or progress has been made towards goals:  Yes, progress has been made overall, but decreased ability to sleep well with increased symptoms over the past week or so    Assessment of Progress: The patient's condition has potential to improve.  The patient has had set backs in their progress.    Self Management Plans:  Patient has been instructed in a home treatment program.  Patient  has been instructed in self management of symptoms.    I have re-evaluated this patient and find that the nature, scope, duration and intensity of the therapy is appropriate for the medical condition of the patient.    April continues to require the following intervention to meet STG and LTG's:  PT    Recommendations:  This patient would benefit from continued therapy.     Frequency:  2 X a month, once daily  Duration:  for 2 months    Thank you for your referral.    INQUIRIES  Therapist: Miriam Dinh, PT   [facility] PHYSICAL THERAPY  [address]  [facility] [address]  Phone: [phone]  Fax: [phone]

## 2019-11-05 ENCOUNTER — THERAPY VISIT (OUTPATIENT)
Dept: PHYSICAL THERAPY | Facility: CLINIC | Age: 49
End: 2019-11-05
Payer: COMMERCIAL

## 2019-11-05 DIAGNOSIS — M54.2 CERVICAL PAIN: ICD-10-CM

## 2019-11-05 PROCEDURE — 97140 MANUAL THERAPY 1/> REGIONS: CPT | Mod: GP | Performed by: PHYSICAL THERAPIST

## 2019-11-05 PROCEDURE — 97110 THERAPEUTIC EXERCISES: CPT | Mod: GP | Performed by: PHYSICAL THERAPIST

## 2019-11-07 ENCOUNTER — TRANSFERRED RECORDS (OUTPATIENT)
Dept: HEALTH INFORMATION MANAGEMENT | Facility: CLINIC | Age: 49
End: 2019-11-07

## 2019-11-18 ENCOUNTER — TRANSFERRED RECORDS (OUTPATIENT)
Dept: HEALTH INFORMATION MANAGEMENT | Facility: CLINIC | Age: 49
End: 2019-11-18

## 2019-11-19 ENCOUNTER — THERAPY VISIT (OUTPATIENT)
Dept: PHYSICAL THERAPY | Facility: CLINIC | Age: 49
End: 2019-11-19
Payer: COMMERCIAL

## 2019-11-19 DIAGNOSIS — M54.2 CERVICAL PAIN: ICD-10-CM

## 2019-11-19 PROCEDURE — 97110 THERAPEUTIC EXERCISES: CPT | Mod: GP | Performed by: PHYSICAL THERAPIST

## 2019-11-19 PROCEDURE — 97140 MANUAL THERAPY 1/> REGIONS: CPT | Mod: GP | Performed by: PHYSICAL THERAPIST

## 2019-12-09 ENCOUNTER — THERAPY VISIT (OUTPATIENT)
Dept: PHYSICAL THERAPY | Facility: CLINIC | Age: 49
End: 2019-12-09
Payer: COMMERCIAL

## 2019-12-09 DIAGNOSIS — M54.2 CERVICAL PAIN: ICD-10-CM

## 2019-12-09 PROCEDURE — 97140 MANUAL THERAPY 1/> REGIONS: CPT | Mod: GP | Performed by: PHYSICAL THERAPIST

## 2019-12-09 PROCEDURE — 97110 THERAPEUTIC EXERCISES: CPT | Mod: GP | Performed by: PHYSICAL THERAPIST

## 2019-12-30 ENCOUNTER — THERAPY VISIT (OUTPATIENT)
Dept: PHYSICAL THERAPY | Facility: CLINIC | Age: 49
End: 2019-12-30
Payer: COMMERCIAL

## 2019-12-30 DIAGNOSIS — M54.2 CERVICAL PAIN: ICD-10-CM

## 2019-12-30 PROCEDURE — 97140 MANUAL THERAPY 1/> REGIONS: CPT | Mod: GP | Performed by: PHYSICAL THERAPIST

## 2019-12-30 PROCEDURE — 97110 THERAPEUTIC EXERCISES: CPT | Mod: GP | Performed by: PHYSICAL THERAPIST

## 2019-12-30 NOTE — PROGRESS NOTES
Subjective:  HPI                    Objective:  System    Physical Exam    General     ROS    Assessment/Plan:    PROGRESS  REPORT    Progress reporting period is from 10/22/2019 to 12/30/2019.       SUBJECTIVE  Patient relates that she is doing well.  No longer experiencing a constant level of symptoms for the neck or back.  Symptoms are intermittent and much less frequent (not really experiencing any symptoms over the past 3 weeks).  Sleep has not been disturbed due to her neck or back symptoms.  If she is uncomfortable when laying down, she will adjust her pillows to go back to sleep with ease.  She does feel that she has control of her symptoms.  Is able to self manage her symptoms with her HEP should they occur.     Current Pain level: 0/10.     Previous pain level was  4/10   Initial Pain level: 7/10.   Changes in function:  Yes (See Goal flowsheet attached for changes in current functional level)  Adverse reaction to treatment or activity: None    OBJECTIVE  Changes noted in objective findings:  Yes, increased neck ROM, good posture and improved function.    Objective:   CROM Rot R 87, Rot L 80, SB R 40, SB L 40, Ext 72. good posture   10/22 - B rotation to 80 deg, 50 deg extension.  Neck rotation increased to the L with neck retraction extension exercise.    She will have tightness along the L paraspinals which does improve with manual therapy and extension biased exercise for the neck and upper back.    NDI score has improved from 32% to 0%    Good posture in sitting.  If does go into more of a slouched sitting posture, she will self correct independently.     ASSESSMENT/PLAN  Updated problem list and treatment plan: Diagnosis 1:  Neck and upper back pain    Decreased ROM/flexibility - home program  STG/LTGs have been met or progress has been made towards goals:  Yes (See Goal flow sheet completed today.)  Assessment of Progress: The patient has met all of their long term goals.  Self Management Plans:   Patient is independent in a home treatment program.  Patient is independent in self management of symptoms.  I have re-evaluated this patient and find that the nature, scope, duration and intensity of the therapy is appropriate for the medical condition of the patient.  April continues to require the following intervention to meet STG and LTG's:  PT intervention is no longer required to meet STG/LTG.    Recommendations:  This patient is ready to be discharged from therapy and continue their home treatment program.    Please refer to the daily flowsheet for treatment today, total treatment time and time spent performing 1:1 timed codes.

## 2019-12-30 NOTE — LETTER
[facility] PHYSICAL THERAP[facility]  [address]  [facility] [address]  [phone]    2019    Re: April Fischer   :   1970  MRN:  [id number]   REFERRING PHYSICIAN:   Melania Taylor    [facility]ICAL [facility]  Date of Initial Evaluation:  19  Visits:  Rxs Used: 20  Reason for Referral:  Cervical pain    PROGRESS  REPORT  Progress reporting period is from 10/22/2019 to 2019.       SUBJECTIVE  Patient relates that she is doing well.  No longer experiencing a constant level of symptoms for the neck or back. Symptoms are intermittent and much less frequent (not really experiencing any symptoms over the past 3 weeks). Sleep has not been disturbed due to her neck or back symptoms.  If she is uncomfortable when laying down, she will adjust her pillows to go back to sleep with ease. She does feel that she has control of her symptoms.  Is able to self manage her symptoms with her HEP should they occur.       Current Pain level: 0/10.     Previous pain level was  4/10   Initial Pain level: 7/10.   Changes in function:  Yes (See Goal flowsheet attached for changes in current functional level)  Adverse reaction to treatment or activity: None    OBJECTIVE    Changes noted in objective findings:  Yes, increased neck ROM, good posture and improved function.      Objective:   CROM Rot R 87, Rot L 80, SB R 40, SB L 40, Ext 72. good posture   10/22 - B rotation to 80 deg, 50 deg extension.  Neck rotation increased to the L with neck retraction extension exercise.    She will have tightness along the L paraspinals which does improve with manual therapy and extension biased exercise for the neck and upper back.    NDI score has improved from 32% to 0%    Good posture in sitting.  If does go into more of a slouched sitting posture, she will self correct independently.       ASSESSMENT/PLAN  Updated problem list and treatment plan: Diagnosis 1:   Neck and upper back pain    Decreased ROM/flexibility - home program    STG/LTGs have been met or progress has been made towards goals:  Yes (See Goal flow sheet completed today.)    Assessment of Progress: The patient has met all of their long term goals.  Self Management Plans:  Patient is independent in a home treatment program.    Patient is independent in self management of symptoms.    I have re-evaluated this patient and find that the nature, scope, duration and intensity of the therapy is appropriate for the medical condition of the patient.    April continues to require the following intervention to meet STG and LTG's:  PT intervention is no longer required to meet STG/LTG.    Recommendations:  This patient is ready to be discharged from therapy and continue their home treatment program.    Thank you for your referral.    INQUIRIES  Therapist: Miriam Dinh PT, Dip. MDT, FAAOMPT  [facility] PHYSICAL THERAP[facility]  [address]  [facility] [address]  Phone: [phone]  Fax: [phone]

## 2020-02-08 ENCOUNTER — HEALTH MAINTENANCE LETTER (OUTPATIENT)
Age: 50
End: 2020-02-08

## 2020-04-28 VITALS — BODY MASS INDEX: 29.88 KG/M2 | WEIGHT: 175 LBS | HEIGHT: 64 IN

## 2020-04-28 ASSESSMENT — MIFFLIN-ST. JEOR: SCORE: 1393.79

## 2020-04-28 NOTE — PROGRESS NOTES
"April Fischer is a 49 year old female who is being evaluated via a billable video visit.      The patient has been notified of following:     \"This video visit will be conducted via a call between you and your physician/provider. We have found that certain health care needs can be provided without the need for an in-person physical exam.  This service lets us provide the care you need with a video conversation.  If a prescription is necessary we can send it directly to your pharmacy.  If lab work is needed we can place an order for that and you can then stop by our lab to have the test done at a later time.    Video visits are billed at different rates depending on your insurance coverage.  Please reach out to your insurance provider with any questions.    If during the course of the call the physician/provider feels a video visit is not appropriate, you will not be charged for this service.\"    Patient has given verbal consent for Video visit? Yes    How would you like to obtain your AVS? Vidal    Patient would like the video invitation sent by: Send to e-mail at: [email or web address]    Will anyone else be joining your video visit? No      Chief complaint: Difficulty falling asleep and staying asleep    History of Present Illness: 49-year-old female with long history of insomnia complaints.  However in the last couple of years things have progressed.  She notes that it can take her 45 minutes to fall asleep and when she falls asleep she wakes up every couple of hours.  Sometimes she will wake up at 2:30 in the morning and then just chooses not to go back to sleep because it still frustrating.  A couple of months back she did not have a girlfriend commented that she was having apneas at night when they shared the same room.  Her  has not commented about apnea but does comment about loud snoring.  Patient often sleeps separately from him in an attempt to improve her own sleep quality.  Her sleep " "schedule is not very regular partly because of her insomnia complaints.  She is also noticed significant weight gain 45 to 50 pounds in the last few months to years.  She has had a lot of problems with decreased energy and fatigue.  She had a thyroid checked in 2018 and it was within normal limits.  She is tried some sleeping medication in the past.  In fact she tried a low-dose of olanzapine for sleep and her bipolar disorder.  She bounced major excessively groggy.  And she is not using it.  She drinks 1-2 caffeinated beverages in the morning.    There is been no other major sleep issues.  No restless legs, rare sleep talking.  She has a history of nightmares in the past is not complaining of this currently.  She denies problems with excessive dry mouth sore throat or headaches.  On occasion she has taken a seat acetaminophen with diphenhydramine which has helped.  She does not drink excessive amounts of alcohol.    Total score - Velva: 5 (4/28/2020  2:00 PM) (Less than 10 normal)    BRIGIDA Total Score: 25 (normal 0-7, mild 8-14, moderate 15-21, severe 22-28)    STOP-BANG  Loud Snore   1  Excessively Tired/Sleepy   0  Observed apnea   1  Hypertension   0  BMI> 35 kg/m2   0  Age >50   1  Neck >16 in/40cm   1  Male Gender   0  Total =   4  (0-2 low, 3-4 intermediate, 5-8 high risk of FLORINDA)      Past Medical History:   Diagnosis Date     Adjustment disorder with mixed anxiety and depressed mood     meds \"[facility]\" on meds in past. (unsure type rx) Stopped4/06 declines restart.     Bipolar 2 disorder, major depressive episode (H)      Perimenopause      Weight gain        No Known Allergies    Current Outpatient Medications   Medication     lamoTRIgine (LAMICTAL) 100 MG tablet     levonorgestrel-ethinyl estradiol (AVIANE/ALESSE/LESSINA) 0.1-20 MG-MCG tablet     venlafaxine (EFFEXOR-ER) 75 MG TB24     No current facility-administered medications for this visit.        Social History     Socioeconomic History     " Marital status:      Spouse name: Not on file     Number of children: 3     Years of education: Not on file     Highest education level: Not on file   Occupational History     Employer: SELF   Social Needs     Financial resource strain: Not on file     Food insecurity     Worry: Not on file     Inability: Not on file     Transportation needs     Medical: Not on file     Non-medical: Not on file   Tobacco Use     Smoking status: Never Smoker     Smokeless tobacco: Never Used   Substance and Sexual Activity     Alcohol use: Yes     Alcohol/week: 0.0 standard drinks     Comment: 1-2 beers per week if anything     Drug use: No     Sexual activity: Yes     Partners: Male     Birth control/protection: Male Surgical   Lifestyle     Physical activity     Days per week: 5 days     Minutes per session: 30 min     Stress: Very much   Relationships     Social connections     Talks on phone: Not on file     Gets together: Not on file     Attends Oriental orthodox service: Not on file     Active member of club or organization: Not on file     Attends meetings of clubs or organizations: Not on file     Relationship status: Not on file     Intimate partner violence     Fear of current or ex partner: Not on file     Emotionally abused: Not on file     Physically abused: Not on file     Forced sexual activity: Not on file   Other Topics Concern     Parent/sibling w/ CABG, MI or angioplasty before 65F 55M? Not Asked   Social History Narrative    , has an 17 yo with cystic fibrosis - all going to [place] next week as a gift to this 17yo from the [employer].   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    Melania Taylor MD, PhD       Family History   Problem Relation Age of Onset     Psychotic Disorder Mother         Many family members with depression.     Hypertension Maternal Grandmother      Breast Cancer Maternal Grandmother      Neurologic Disorder Maternal Grandmother      Anxiety Disorder Maternal Grandmother  "        MANY family members deal with anxiety     Mental Illness Maternal Grandmother         MANY family members     Depression Maternal Grandmother      Cerebrovascular Disease Maternal Grandfather      Diabetes Paternal Grandfather      Neurologic Disorder Maternal Aunt      Neurologic Disorder Maternal Aunt      Neurologic Disorder Maternal Uncle      Neurologic Disorder Brother      Genetic Disorder Daughter         cystic fibrosis     Anxiety Disorder Son      Mental Illness Son      Depression Son      Genetic Disorder Son         Transgender (born female, Cecilia)     Anxiety Disorder Cousin      Mental Illness Other      Depression Other        Review of Systems: Positve for occasional night sweats and per HPI, otherwise comprehensive review of systems is negative.    EXAM  Ht 1.626 m (5' 4\")   Wt 79.4 kg (175 lb)   BMI 30.04 kg/m    GENERAL: healthy, alert and no distress  EYES: Eyes grossly normal to inspection, conjunctivae and sclerae normal  RESP: no audible wheeze, cough, or visible cyanosis.  No visible retractions or increased work of breathing.  Able to speak fully in complete sentences.  NEURO: Cranial nerves grossly intact, mentation intact and speech normal  PSYCH: mentation appears normal, affect normal/bright, judgement and insight intact, normal speech and appearance well-groomed    TSH 8/9/2018 normal 0.59    ASSESSMENT:  49-year-old perimenopausal female with sleep initiation and sleep maintenance insomnia, snoring and recently observed apneas.  Insomnia issues have been longstanding however appear to have progressed.  I suspect she may have developed obstructive sleep apnea with recent weight gain and development of obesity.    PLAN:  Extensive discussion regarding the pathophysiology of obstructive sleep apnea, testing options and treatment options including oral appliances, CPAP, weight loss and position restriction.  I counseled her that we are not doing testing at this time but will " be resuming hopefully shortly with home sleep apnea testing.  Patient is agreeable with proceeding.  If she however does have sleep apnea and continues to have significant insomnia issues after treatment would then proceed with formal referral for cognitive behavioral therapy for insomnia.  I did discuss this briefly with her.  She is agreeable with this plan.  Please see patient instructions for further details of counseling provided.    Lorena Sharma M.D.  Pulmonary/Critical Care/Sleep Medicine    [facility]   Floor [address], [address]   [address] [place], [place] [address]. [place] [address]   Appointments: [phone]    The above note was dictated using voice recognition software and may include typographical errors. Please contact the author for any clarifications.            Video-Visit Details    Type of service:  Video Visit    Video Start Time: 10:07 AM with [facility] chat feature-video not working-had pt call help line at [phone]  [facility] failed and call line unable to assist pat  Tried with [employer] Video call    Video End Time: Via [employer] Video (Start10:16) to end 10:41 AM    Originating Location (pt. Location): Home    Distant Location (provider location):  [facility]     Mode of Communication:  Video Conference via 2 Minutes      Lorena Sharma MD

## 2020-04-28 NOTE — PATIENT INSTRUCTIONS
"MY TREATMENT INFORMATION FOR SLEEP APNEA-  April Fischer    DOCTOR : Lorena Sharma MD      Am I having a home sleep study?  Watch this video:  /drop off device-   Https://www.youDelfmemsube.com/watch?v=yGGFBdELGhk  Disposable device sent out require phone/computer application-   https://www.Bilneurube.com/watch?v=BCce_vbiwxE  Please verify your insurance coverage with your insurance carrier    Frequently asked questions:  1. What is Obstructive Sleep Apnea (FLORINDA)? FLORINDA is the most common type of sleep apnea. Apnea means, \"without breath.\"  Apnea is most often caused by narrowing or collapse of the upper airway as muscles relax during sleep.   Almost everyone has occasional apneas. Most people with sleep apnea have had brief interruptions at night frequently for many years.  The severity of sleep apnea is related to how frequent and severe the events are.   2. What are the consequences of FLORINDA? Symptoms include: feeling sleepy during the day, snoring loudly, gasping or stopping of breathing, trouble sleeping, and occasionally morning headaches or heartburn at night.  Sleepiness can be serious and even increase the risk of falling asleep while driving. Other health consequences may include development of high blood pressure and other cardiovascular disease in persons who are susceptible. Untreated FLORINDA  can contribute to heart disease, stroke and diabetes.   3. What are the treatment options? In most situations, sleep apnea is a lifelong disease that must be managed with daily therapy. Medications are not effective for sleep apnea and surgery is generally not considered until other therapies have been tried. Your treatment is your choice . Continuous Positive Airway (CPAP) works right away and is the therapy that is effective in nearly everyone. An oral device to hold your jaw forward is usually the next most reliable option. Other options include postioning devices (to keep you off your back), weight loss, " and surgery including a tongue pacing device. There is more detail about some of these options below.    Important tips for using CPAP and similar devices   Know your equipment:  CPAP is continuous positive airway pressure that prevents obstructive sleep apnea by keeping the throat from collapsing while you are sleeping. In most cases, the device is  smart  and can slowly self-adjusts if your throat collapses and keeps a record every day of how well you are treated-this information is available to you and your care team.  BPAP is bilevel positive airway pressure that keeps your throat open and also assists each breath with a pressure boost to maintain adequate breathing.  Special kinds of BPAP are used in patients who have inadequate breathing from lung or heart disease. In most cases, the device is  smart  and can slowly self-adjusts to assist breathing. Like CPAP, the device keeps a record of how well you are treated.  Your mask is your connection to the device. You get to choose what feels most comfortable and the staff will help to make sure if fits. Here: are some examples of the different masks that are available:       Key points to remember on your journey with sleep apnea:  1. Sleep study.  PAP devices often need to be adjusted during a sleep study to show that they are effective and adjusted right.  2. Good tips to remember: Try wearing just the mask during a quiet time during the day so your body adapts to wearing it. A humidifier is recommended for comfort in most cases to prevent drying of your nose and throat. Allergy medication from your provider may help you if you are having nasal congestion.  3. Getting settled-in. It takes more than one night for most of us to get used to wearing a mask. Try wearing just the mask during a quiet time during the day so your body adapts to wearing it. A humidifier is recommended for comfort in most cases. Our team will work with you carefully on the first day and  will be in contact within 4 days and again at 2 and 4 weeks for advice and remote device adjustments. Your therapy is evaluated by the device each day.   4. Use it every night. The more you are able to sleep naturally for 7-8 hours, the more likely you will have good sleep and to prevent health risks or symptoms from sleep apnea. Even if you use it 4 hours it helps. Occasionally all of us are unable to use a medical therapy, in sleep apnea, it is not dangerous to miss one night.   5. Communicate. Call our skilled team on the number provided on the first day if your visit for problems that make it difficult to wear the device. Over 2 out of 3 patients can learn to wear the device long-term with help from our team. Remember to call our team or your sleep providers if you are unable to wear the device as we may have other solutions for those who cannot adapt to mask CPAP therapy. It is recommended that you sleep your sleep provider within the first 3 months and yearly after that if you are not having problems.   6. Use it for your health. We encourage use of CPAP masks during daytime quiet periods to allow your face and brain to adapt to the sensation of CPAP so that it will be a more natural sensation to awaken to at night or during naps. This can be very useful during the first few weeks or months of adapting to CPAP though it does not help medically to wear CPAP during wakefulness and  should not be used as a strategy just to meet guidelines.  7. Take care of your equipment. Make sure you clean your mask and tubing using directions every day and that your filter and mask are replaced as recommended or if they are not working.     BESIDES CPAP, WHAT OTHER THERAPIES ARE THERE?    Positioning Device  Positioning devices are generally used when sleep apnea is mild and only occurs on your back.This example shows a pillow that straps around the waist. It may be appropriate for those whose sleep study shows milder sleep  apnea that occurs primarily when lying flat on one's back. Preliminary studies have shown benefit but effectiveness at home may need to be verified by a home sleep test. These devices are generally not covered by medical insurance.  Examples of devices that maintain sleeping on the back to prevent snoring and mild sleep apnea.    Belt type body positioner  Http://Roller[employer]/    Electronic reminder  Http://nightshifttherapy.com/  Http://[email or web address].[email or web address]/      Oral Appliance  What is oral appliance therapy?  An oral appliance device fits on your teeth at night like a retainer used after having braces. The device is made by a specialized dentist and requires several visits over 1-2 months before a manufactured device is made to fit your teeth and is adjusted to prevent your sleep apnea. Once an oral device is working properly, snoring should be improved. A home sleep test may be recommended at that time if to determine whether the sleep apnea is adequately treated.       Some things to remember:  -Oral devices are often, but not always, covered by your medical insurance. Be sure to check with your insurance provider.   -If you are referred for oral therapy, you will be given a list of specialized dentists to consider or you may choose to visit the Web site of the American Academy of Dental Sleep Medicine  -Oral devices are less likely to work if you have severe sleep apnea or are extremely overweight.     More detailed information  An oral appliance is a small acrylic device that fits over the upper and lower teeth  (similar to a retainer or a mouth guard). This device slightly moves jaw forward, which moves the base of the tongue forward, opens the airway, improves breathing for effective treat snoring and obstructive sleep apnea in perhaps 7 out of 10 people .  The best working devices are custom-made by a dental device  after a mold is made of the teeth 1, 2, 3.  When is an oral appliance  indicated?  Oral appliance therapy is recommended as a first-line treatment for patients with primary snoring, mild sleep apnea, and for patients with moderate sleep apnea who prefer appliance therapy to use of CPAP4, 5. Severity of sleep apnea is determined by sleep testing and is based on the number of respiratory events per hour of sleep.   How successful is oral appliance therapy?  The success rate of oral appliance therapy in patients with mild sleep apnea is 75-80% while in patients with moderate sleep apnea it is 50-70%. The chance of success in patients with severe sleep apnea is 40-50%. The research also shows that oral appliances have a beneficial effect on the cardiovascular health of FLORINDA patients at the same magnitude as CPAP therapy7.  Oral appliances should be a second-line treatment in cases of severe sleep apnea, but if not completely successful then a combination therapy utilizing CPAP plus oral appliance therapy may be effective. Oral appliances tend to be effective in a broad range of patients although studies show that the patients who have the highest success are females, younger patients, those with milder disease, and less severe obesity. 3, 6.   Finding a dentist that practices dental sleep medicine  Specific training is available through the American Academy of Dental Sleep Medicine for dentists interested in working in the field of sleep. To find a dentist who is educated in the field of sleep and the use of oral appliances, near you, visit the Web site of the American Academy of Dental Sleep Medicine.    References  1. Rob et al. Objectively measured vs self-reported compliance during oral appliance therapy for sleep-disordered breathing. Chest 2013; 144(5): 7267-9967.  2. Leland, et al. Objective measurement of compliance during oral appliance therapy for sleep-disordered breathing. Thorax 2013; 68(1): 91-96.  3. Cornelius et al. Mandibular advancement devices in 620 men and  women with FLORINDA and snoring: tolerability and predictors of treatment success. Chest 2004; 125: 2939-9293.  4. Valentino et al. Oral appliances for snoring and FLORINDA: a review. Sleep 2006; 29: 244-262.  5. Barbie et al. Oral appliance treatment for FLORINDA: an update. J Clin Sleep Med 2014; 10(2): 215-227.  6. Jovita et al. Predictors of OSAH treatment outcome. J Dent Res 2007; 86: 1916-5382.      Weight Loss:    Weight loss is a long-term strategy that may improve sleep apnea in some patients.    Surgery:    Surgery for obstructive sleep apnea is considered generally only when other therapies fail to work. Surgery may be discussed with you if you are having a difficult time tolerating CPAP and or when there is an abnormal structure that requires surgical correction.  Nose and throat surgeries often enlarge the airway to prevent collapse.  Most of these surgeries create pain for 1-2 weeks and up to half of the most common surgeries are not effective throughout life.  You should carefully discuss the benefits and drawbacks to surgery with your sleep provider and surgeon to determine if it is the best solution for you.   More information  Surgery for FLORINDA is directed at areas that are responsible for narrowing or complete obstruction of the airway during sleep.  There are a wide range of procedures available to enlarge and/or stabilize the airway to prevent blockage of breathing in the three major areas where it can occur: the palate, tongue, and nasal regions.  Successful surgical treatment depends on the accurate identification of the factors responsible for obstructive sleep apnea in each person.  A personalized approach is required because there is no single treatment that works well for everyone.  Because of anatomic variation, consultation with an examination by a sleep surgeon is a critical first step in determining what surgical options are best for each patient.  In some cases, examination during sedation may  be recommended in order to guide the selection of procedures.  Patients will be counseled about risks and benefits as well as the typical recovery course after surgery. Surgery is typically not a cure for a person s FLORINDA.  However, surgery will often significantly improve one s FLORINDA severity (termed  success rate ).  Even in the absence of a cure, surgery will decrease the cardiovascular risk associated with OSA7; improve overall quality of life8 (sleepiness, functionality, sleep quality, etc).      Palate Procedures:  Patients with FLORINDA often have narrowing of their airway in the region of their tonsils and uvula.  The goals of palate procedures are to widen the airway in this region as well as to help the tissues resist collapse.  Modern palate procedure techniques focus on tissue conservation and soft tissue rearrangement, rather than tissue removal.  Often the uvula is preserved in this procedure. Residual sleep apnea is common in patient after pharyngoplasty with an average reduction in sleep apnea events of 33%2.      Tongue Procedures:  ExamWhile patients are awake, the muscles that surround the throat are active and keep this region open for breathing. These muscles relax during sleep, allowing the tongue and other structures to collapse and block breathing.  There are several different tongue procedures available.  Selection of a tongue base procedure depends on characteristics seen on physical exam.  Generally, procedures are aimed at removing bulky tissues in this area or preventing the back of the tongue from falling back during sleep.  Success rates for tongue surgery range from 50-62%3.    Hypoglossal Nerve Stimulation:  Hypoglossal nerve stimulation has recently received approval from the [place] Food and Drug Administration for the treatment of obstructive sleep apnea.  This is based on research showing that the system was safe and effective in treating sleep apnea6.  Results showed that the  median AHI score decreased 68%, from 29.3 to 9.0. This therapy uses an implant system that senses breathing patterns and delivers mild stimulation to airway muscles, which keeps the airway open during sleep.  The system consists of three fully implanted components: a small generator (similar in size to a pacemaker), a breathing sensor, and a stimulation lead.  Using a small handheld remote, a patient turns the therapy on before bed and off upon awakening.    Candidates for this device must be greater than 22 years of age, have moderate to severe FLORINDA (AHI between 20-65), BMI less than 32, have tried CPAP/oral appliance without success, and have appropriate upper airway anatomy (determined by a sleep endoscopy performed by Dr. Cash).    Hypoglossal Nerve Stimulation Pathway:    The sleep surgeon s office will work with the patient through the insurance prior-authorization process (including communications and appeals).    Nasal Procedures:  Nasal obstruction can interfere with nasal breathing during the day and night.  Studies have shown that relief of nasal obstruction can improve the ability of some patients to tolerate positive airway pressure therapy for obstructive sleep apnea1.  Treatment options include medications such as nasal saline, topical corticosteroid and antihistamine sprays, and oral medications such as antihistamines or decongestants. Non-surgical treatments can include external nasal dilators for selected patients. If these are not successful by themselves, surgery can improve the nasal airway either alone or in combination with these other options.      Combination Procedures:  Combination of surgical procedures and other treatments may be recommended, particularly if patients have more than one area of narrowing or persistent positional disease.  The success rate of combination surgery ranges from 66-80%2,3.    References  1. Patricia NORMAN. The Role of the Nose in Snoring and Obstructive Sleep  Apnoea: An Update.  Eur Arch Otorhinolaryngol. 2011; 268: 1365-73.  2.  Casey SM; Ernesto JA; Thais JR; Pallanch JF; Darien MB; Kerline SG; Cyn PAIZ. Surgical modifications of the upper airway for obstructive sleep apnea in adults: a systematic review and meta-analysis. SLEEP 2010;33(10):5994-2797. Lito AUGUSTIN. Hypopharyngeal surgery in obstructive sleep apnea: an evidence-based medicine review.  Arch Otolaryngol Head Neck Surg. 2006 Feb;132(2):206-13.  3. Tom YH1, Terri Y, Ramu NIRANJAN. The efficacy of anatomically based multilevel surgery for obstructive sleep apnea. Otolaryngol Head Neck Surg. 2003 Oct;129(4):327-35.  4. Kezirian E, Goldberg A. Hypopharyngeal Surgery in Obstructive Sleep Apnea: An Evidence-Based Medicine Review. Arch Otolaryngol Head Neck Surg. 2006 Feb;132(2):206-13.  5. Alicja QUINN et al. Upper-Airway Stimulation for Obstructive Sleep Apnea.  N Engl J Med. 2014 Jan 9;370(2):139-49.  6. Sherly Y et al. Increased Incidence of Cardiovascular Disease in Middle-aged Men with Obstructive Sleep Apnea. Am J Respir Crit Care Med; 2002 166: 159-165  7. Bijal EM et al. Studying Life Effects and Effectiveness of Palatopharyngoplasty (SLEEP) study: Subjective Outcomes of Isolated Uvulopalatopharyngoplasty. Otolaryngol Head Neck Surg. 2011; 144: 623-631.            Your BMI is Body mass index is 30.04 kg/m .  Weight management is a personal decision.  If you are interested in exploring weight loss strategies, the following discussion covers the approaches that may be successful. Body mass index (BMI) is one way to tell whether you are at a healthy weight, overweight, or obese. It measures your weight in relation to your height.  A BMI of 18.5 to 24.9 is in the healthy range. A person with a BMI of 25 to 29.9 is considered overweight, and someone with a BMI of 30 or greater is considered obese. More than two-thirds of American adults are considered overweight or obese.  Being overweight or obese increases the  risk for further weight gain. Excess weight may lead to heart disease and diabetes.  Creating and following plans for healthy eating and physical activity may help you improve your health.  Weight control is part of healthy lifestyle and includes exercise, emotional health, and healthy eating habits. Careful eating habits lifelong are the mainstay of weight control. Though there are significant health benefits from weight loss, long-term weight loss with diet alone may be very difficult to achieve- studies show long-term success with dietary management in less than 10% of people. Attaining a healthy weight may be especially difficult to achieve in those with severe obesity. In some cases, medications, devices and surgical management might be considered.  What can you do?  If you are overweight or obese and are interested in methods for weight loss, you should discuss this with your provider.     Consider reducing daily calorie intake by 500 calories.     Keep a food journal.     Avoiding skipping meals, consider cutting portions instead.    Diet combined with exercise helps maintain muscle while optimizing fat loss. Strength training is particularly important for building and maintaining muscle mass. Exercise helps reduce stress, increase energy, and improves fitness. Increasing exercise without diet control, however, may not burn enough calories to loose weight.       Start walking three days a week 10-20 minutes at a time    Work towards walking thirty minutes five days a week     Eventually, increase the speed of your walking for 1-2 minutes at time    In addition, we recommend that you review healthy lifestyles and methods for weight loss available through the National Institutes of Health patient information sites:  http://win.niddk.nih.gov/publications/index.htm    And look into health and wellness programs that may be available through your health insurance provider, employer, local community center, or [place]  club.    Weight management plan: Patient was referred to their PCP to discuss a diet and exercise plan.

## 2020-04-29 ENCOUNTER — VIRTUAL VISIT (OUTPATIENT)
Dept: SLEEP MEDICINE | Facility: CLINIC | Age: 50
End: 2020-04-29
Payer: COMMERCIAL

## 2020-04-29 DIAGNOSIS — G47.30 OBSERVED SLEEP APNEA: ICD-10-CM

## 2020-04-29 DIAGNOSIS — G47.00 INSOMNIA, UNSPECIFIED TYPE: Primary | ICD-10-CM

## 2020-04-29 DIAGNOSIS — R06.83 SNORING: ICD-10-CM

## 2020-04-29 DIAGNOSIS — E66.811 CLASS 1 OBESITY DUE TO EXCESS CALORIES WITHOUT SERIOUS COMORBIDITY WITH BODY MASS INDEX (BMI) OF 30.0 TO 30.9 IN ADULT: ICD-10-CM

## 2020-04-29 DIAGNOSIS — E66.09 CLASS 1 OBESITY DUE TO EXCESS CALORIES WITHOUT SERIOUS COMORBIDITY WITH BODY MASS INDEX (BMI) OF 30.0 TO 30.9 IN ADULT: ICD-10-CM

## 2020-04-29 PROCEDURE — 99204 OFFICE O/P NEW MOD 45 MIN: CPT | Mod: GT | Performed by: INTERNAL MEDICINE

## 2020-05-29 ENCOUNTER — TELEPHONE (OUTPATIENT)
Dept: UROLOGY | Facility: CLINIC | Age: 50
End: 2020-05-29

## 2020-05-29 NOTE — TELEPHONE ENCOUNTER
[facility] Call Center    Phone Message    May a detailed message be left on voicemail: yes     Reason for Call: Other: Pt in new to clinic and wants to be seen in [place] so according to GL she needs to have a clinical review of her symptoms before scheduling.  Pt believes she has a bladder infection but does not have a referral.  Please find her the earliest appointment for [place] with her symptoms and call to schedule.  thanks     Action Taken: Message routed to:  [facility] ([facility] urology    Travel Screening: Not Applicable

## 2020-06-06 DIAGNOSIS — N95.1 PERIMENOPAUSAL SYMPTOMS: ICD-10-CM

## 2020-06-11 RX ORDER — LEVONORGESTREL AND ETHINYL ESTRADIOL 0.1-0.02MG
KIT ORAL
Qty: 84 TABLET | Refills: 3 | Status: SHIPPED | OUTPATIENT
Start: 2020-06-11 | End: 2020-10-15

## 2020-06-15 ENCOUNTER — OFFICE VISIT (OUTPATIENT)
Dept: UROLOGY | Facility: CLINIC | Age: 50
End: 2020-06-15
Payer: COMMERCIAL

## 2020-06-15 VITALS
SYSTOLIC BLOOD PRESSURE: 120 MMHG | HEART RATE: 83 BPM | DIASTOLIC BLOOD PRESSURE: 80 MMHG | WEIGHT: 175 LBS | HEIGHT: 63 IN | BODY MASS INDEX: 31.01 KG/M2 | OXYGEN SATURATION: 98 %

## 2020-06-15 DIAGNOSIS — Z87.440 PERSONAL HISTORY OF URINARY TRACT INFECTION: Primary | ICD-10-CM

## 2020-06-15 LAB
ALBUMIN UR-MCNC: NEGATIVE MG/DL
APPEARANCE UR: CLEAR
BILIRUB UR QL STRIP: NEGATIVE
COLOR UR AUTO: YELLOW
GLUCOSE UR STRIP-MCNC: NEGATIVE MG/DL
HGB UR QL STRIP: ABNORMAL
KETONES UR STRIP-MCNC: NEGATIVE MG/DL
LEUKOCYTE ESTERASE UR QL STRIP: NEGATIVE
NITRATE UR QL: NEGATIVE
PH UR STRIP: 5.5 PH (ref 5–7)
RBC #/AREA URNS AUTO: 0 /HPF (ref 0–2)
RESIDUAL VOLUME (RV) (EXTERNAL): 8
SOURCE: ABNORMAL
SP GR UR STRIP: 1.01 (ref 1–1.03)
UROBILINOGEN UR STRIP-ACNC: 0.2 EU/DL (ref 0.2–1)
WBC #/AREA URNS AUTO: 1 /HPF (ref 0–5)

## 2020-06-15 PROCEDURE — 81001 URINALYSIS AUTO W/SCOPE: CPT | Performed by: PHYSICIAN ASSISTANT

## 2020-06-15 PROCEDURE — 99203 OFFICE O/P NEW LOW 30 MIN: CPT | Mod: 25 | Performed by: PHYSICIAN ASSISTANT

## 2020-06-15 PROCEDURE — 51798 US URINE CAPACITY MEASURE: CPT | Performed by: PHYSICIAN ASSISTANT

## 2020-06-15 RX ORDER — ESTRADIOL 0.1 MG/G
CREAM VAGINAL
Qty: 42.5 G | Refills: 3 | Status: SHIPPED | OUTPATIENT
Start: 2020-06-15 | End: 2020-10-15

## 2020-06-15 ASSESSMENT — ENCOUNTER SYMPTOMS
DEPRESSION: 0
PANIC: 1
INSOMNIA: 1
POOR WOUND HEALING: 0
SKIN CHANGES: 1
DECREASED LIBIDO: 0
BLOATING: 1
NAUSEA: 0
JAUNDICE: 0
VOMITING: 0
BLOOD IN STOOL: 0
DIARRHEA: 1
FLANK PAIN: 0
HEARTBURN: 0
ABDOMINAL PAIN: 0
BOWEL INCONTINENCE: 0
RECTAL PAIN: 0
DIFFICULTY URINATING: 1
NAIL CHANGES: 0
DYSURIA: 1
HEMATURIA: 0
DECREASED CONCENTRATION: 1
HOT FLASHES: 1
CONSTIPATION: 1
NERVOUS/ANXIOUS: 1

## 2020-06-15 ASSESSMENT — MIFFLIN-ST. JEOR: SCORE: 1377.92

## 2020-06-15 ASSESSMENT — PAIN SCALES - GENERAL: PAINLEVEL: NO PAIN (0)

## 2020-06-15 NOTE — PROGRESS NOTES
"Melissa 15, 2020      CC: Painful urination    HPI:  April Fischer is a 49 year old female who presents for consultation from Martha Gomez CNP for evaluation of the above.  Had an e coli UTI in April. Has received two courses of abx. Symptoms are better (urgency, frequency, pressure, some burning), but not resolved. Notes vaginal dryness. Questions if maybe the UTI was intercourse related.     No gross hematuria, pelvic or abdominal pain, hx of stones. Mom has a hx of kidney stones.     Past Medical History:   Diagnosis Date     Adjustment disorder with mixed anxiety and depressed mood     meds \"[facility]\" on meds in past. (unsure type rx) Stopped4/06 declines restart.     Bipolar 2 disorder, major depressive episode (H)      Perimenopause      Weight gain        Past Surgical History:   Procedure Laterality Date     NO HISTORY OF SURGERY         Social History     Socioeconomic History     Marital status:      Spouse name: Not on file     Number of children: 3     Years of education: Not on file     Highest education level: Not on file   Occupational History     Employer: SELF   Social Needs     Financial resource strain: Not on file     Food insecurity     Worry: Not on file     Inability: Not on file     Transportation needs     Medical: Not on file     Non-medical: Not on file   Tobacco Use     Smoking status: Never Smoker     Smokeless tobacco: Never Used   Substance and Sexual Activity     Alcohol use: Yes     Alcohol/week: 0.0 standard drinks     Comment: 1-2 beers per week if anything     Drug use: No     Sexual activity: Yes     Partners: Male     Birth control/protection: Male Surgical   Lifestyle     Physical activity     Days per week: 5 days     Minutes per session: 30 min     Stress: Very much   Relationships     Social connections     Talks on phone: Not on file     Gets together: Not on file     Attends Buddhism service: Not on file     Active member of club or organization: Not on " file     Attends meetings of clubs or organizations: Not on file     Relationship status: Not on file     Intimate partner violence     Fear of current or ex partner: Not on file     Emotionally abused: Not on file     Physically abused: Not on file     Forced sexual activity: Not on file   Other Topics Concern     Parent/sibling w/ CABG, MI or angioplasty before 65F 55M? Not Asked   Social History Narrative    , has an 19 yo with cystic fibrosis - all going to [employer] next week as a gift to this 17yo from the [employer].   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    Melania Taylor MD, PhD       Family History   Problem Relation Age of Onset     Psychotic Disorder Mother         Many family members with depression.     Hypertension Maternal Grandmother      Breast Cancer Maternal Grandmother      Neurologic Disorder Maternal Grandmother      Anxiety Disorder Maternal Grandmother         MANY family members deal with anxiety     Mental Illness Maternal Grandmother         MANY family members     Depression Maternal Grandmother      Cerebrovascular Disease Maternal Grandfather      Diabetes Paternal Grandfather      Neurologic Disorder Maternal Aunt      Neurologic Disorder Maternal Aunt      Neurologic Disorder Maternal Uncle      Neurologic Disorder Brother      Genetic Disorder Daughter         cystic fibrosis     Anxiety Disorder Son      Mental Illness Son      Depression Son      Genetic Disorder Son         Transgender (born female, Cecilia)     Anxiety Disorder Cousin      Mental Illness Other      Depression Other        ROS:14 point ROS neg other than the symptoms noted above in the HPI.    No Known Allergies    Current Outpatient Medications   Medication     estradiol (ESTRACE VAGINAL) 0.1 MG/GM vaginal cream     lamoTRIgine (LAMICTAL) 100 MG tablet     venlafaxine (EFFEXOR-ER) 75 MG TB24     SRONYX 0.1-20 MG-MCG tablet     No current facility-administered medications for this visit.   "        PEx:   Blood pressure 120/80, pulse 83, height 1.6 m (5' 3\"), weight 79.4 kg (175 lb), SpO2 98 %, not currently breastfeeding.  GEN: NAD  EYES: EOMI  MOUTH: MMM  NECK: Supple  RESP: Unlabored breathing  CARDIAC: No LE edema  SKIN: Warm  ABD: soft  NEURO: AAO    Urine: negative other than mod blood  PVR: 8cc    A/P: April Fischer is a 49 year old female with dysuria.  - U/A, moderater blood, currently with menses  - Mycoplasma and Ureaplasma  -Trial of estrogen cream, this will treat atrophic vaginitis and urethral changes which may impact her pelvic discomfort and improve her urinary urgency.  - Bladder irritant list provided.  - Hydrate  - Lifestyle and hygiene modifications were reviewed today in clinic, including wiping front to back, wearing cotton breathable underwear, voiding before and after intercourse to flush the urethra, minimizing baths and opting for showers, and appropriate perineal hygiene.   -Consider CT and cysto if sx persist.     Phone visit 3 months.     Andree Brannon PA-C  [facility]    30 minutes were spent with the patient today, > 50% in counseling and coordination of care                  "

## 2020-06-15 NOTE — PATIENT INSTRUCTIONS
Below is a list of things that can irritate the bladder and should be eliminated:      Caffeinated soft drinks.    Coffee.    Tea.    Chocolate.    Tomato-based foods.    Acidic juices and fruits. (includes cranberry juice)    Alcohol.    Carbonated drinks.    Aspartame/Nutrasweet.    Mycoplasma and ureaplasma cultures (7-10 days for results)    Estrogen cream three times a week near urethra (pea-sized amount): If >$40, let me know and we can get via a compound pharmacy.

## 2020-06-15 NOTE — LETTER
"6/15/2020       RE: April Fischer  [address] [facility] [address]     Dear Colleague,    Thank you for referring your patient, April Fischer, to the [facility] at [facility]. Please see a copy of my visit note below.    Melissa 15, 2020      CC: Painful urination    HPI:  April Fischer is a 49 year old female who presents for consultation from Martha Gomez CNP for evaluation of the above.  Had an e coli UTI in April. Has received two courses of abx. Symptoms are better (urgency, frequency, pressure, some burning), but not resolved. Notes vaginal dryness. Questions if maybe the UTI was intercourse related.     No gross hematuria, pelvic or abdominal pain, hx of stones. Mom has a hx of kidney stones.     Past Medical History:   Diagnosis Date     Adjustment disorder with mixed anxiety and depressed mood     meds \"[facility]\" on meds in past. (unsure type rx) Stopped4/06 declines restart.     Bipolar 2 disorder, major depressive episode (H)      Perimenopause      Weight gain        Past Surgical History:   Procedure Laterality Date     NO HISTORY OF SURGERY         Social History     Socioeconomic History     Marital status:      Spouse name: Not on file     Number of children: 3     Years of education: Not on file     Highest education level: Not on file   Occupational History     Employer: SELF   Social Needs     Financial resource strain: Not on file     Food insecurity     Worry: Not on file     Inability: Not on file     Transportation needs     Medical: Not on file     Non-medical: Not on file   Tobacco Use     Smoking status: Never Smoker     Smokeless tobacco: Never Used   Substance and Sexual Activity     Alcohol use: Yes     Alcohol/week: 0.0 standard drinks     Comment: 1-2 beers per week if anything     Drug use: No     Sexual activity: Yes     Partners: Male     Birth control/protection: " Male Surgical   Lifestyle     Physical activity     Days per week: 5 days     Minutes per session: 30 min     Stress: Very much   Relationships     Social connections     Talks on phone: Not on file     Gets together: Not on file     Attends Episcopal service: Not on file     Active member of club or organization: Not on file     Attends meetings of clubs or organizations: Not on file     Relationship status: Not on file     Intimate partner violence     Fear of current or ex partner: Not on file     Emotionally abused: Not on file     Physically abused: Not on file     Forced sexual activity: Not on file   Other Topics Concern     Parent/sibling w/ CABG, MI or angioplasty before 65F 55M? Not Asked   Social History Narrative    , has an 17 yo with cystic fibrosis - all going to [employer] next week as a gift to this 17yo from the [employer].   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    Melania Taylor MD, PhD       Family History   Problem Relation Age of Onset     Psychotic Disorder Mother         Many family members with depression.     Hypertension Maternal Grandmother      Breast Cancer Maternal Grandmother      Neurologic Disorder Maternal Grandmother      Anxiety Disorder Maternal Grandmother         MANY family members deal with anxiety     Mental Illness Maternal Grandmother         MANY family members     Depression Maternal Grandmother      Cerebrovascular Disease Maternal Grandfather      Diabetes Paternal Grandfather      Neurologic Disorder Maternal Aunt      Neurologic Disorder Maternal Aunt      Neurologic Disorder Maternal Uncle      Neurologic Disorder Brother      Genetic Disorder Daughter         cystic fibrosis     Anxiety Disorder Son      Mental Illness Son      Depression Son      Genetic Disorder Son         Transgender (born female, Cecilia)     Anxiety Disorder Cousin      Mental Illness Other      Depression Other      ROS:14 point ROS neg other than the symptoms noted  "above in the HPI.    No Known Allergies    Current Outpatient Medications   Medication     estradiol (ESTRACE VAGINAL) 0.1 MG/GM vaginal cream     lamoTRIgine (LAMICTAL) 100 MG tablet     venlafaxine (EFFEXOR-ER) 75 MG TB24     SRONYX 0.1-20 MG-MCG tablet     No current facility-administered medications for this visit.      PEx:   Blood pressure 120/80, pulse 83, height 1.6 m (5' 3\"), weight 79.4 kg (175 lb), SpO2 98 %, not currently breastfeeding.  GEN: NAD  EYES: EOMI  MOUTH: MMM  NECK: Supple  RESP: Unlabored breathing  CARDIAC: No LE edema  SKIN: Warm  ABD: soft  NEURO: AAO    Urine: negative other than mod blood  PVR: 8cc    A/P: April Fischer is a 49 year old female with dysuria.  - U/A, moderater blood, currently with menses  - Mycoplasma and Ureaplasma  -Trial of estrogen cream, this will treat atrophic vaginitis and urethral changes which may impact her pelvic discomfort and improve her urinary urgency.  - Bladder irritant list provided.  - Hydrate  - Lifestyle and hygiene modifications were reviewed today in clinic, including wiping front to back, wearing cotton breathable underwear, voiding before and after intercourse to flush the urethra, minimizing baths and opting for showers, and appropriate perineal hygiene.   -Consider CT and cysto if sx persist.     Phone visit 3 months.     Andree Brannon PA-C  [facility]    30 minutes were spent with the patient today, > 50% in counseling and coordination of care  "

## 2020-06-15 NOTE — NURSING NOTE
Chief Complaint   Patient presents with     UTI     recurring   PVR was 8 ml. Patient states she voids frequently.  Shena Gillette LPN

## 2020-07-13 ENCOUNTER — TELEPHONE (OUTPATIENT)
Dept: SLEEP MEDICINE | Facility: CLINIC | Age: 50
End: 2020-07-13

## 2020-09-18 ENCOUNTER — VIRTUAL VISIT (OUTPATIENT)
Dept: UROLOGY | Facility: CLINIC | Age: 50
End: 2020-09-18
Payer: COMMERCIAL

## 2020-09-18 DIAGNOSIS — R30.0 DYSURIA: Primary | ICD-10-CM

## 2020-09-18 PROCEDURE — 99212 OFFICE O/P EST SF 10 MIN: CPT | Mod: GT | Performed by: PHYSICIAN ASSISTANT

## 2020-09-18 NOTE — PROGRESS NOTES
"April Fischer is a 50 year old female who is being evaluated via a billable video visit.      The patient has been notified of following:     \"This video visit will be conducted via a call between you and your physician/provider. We have found that certain health care needs can be provided without the need for an in-person physical exam.  This service lets us provide the care you need with a video conversation.  If a prescription is necessary we can send it directly to your pharmacy.  If lab work is needed we can place an order for that and you can then stop by our lab to have the test done at a later time.    Video visits are billed at different rates depending on your insurance coverage.  Please reach out to your insurance provider with any questions.    If during the course of the call the physician/provider feels a video visit is not appropriate, you will not be charged for this service.\"    Patient has given verbal consent for Video visit? Yes  How would you like to obtain your AVS? MyChart  If you are dropped from the video visit, the video invite should be resent to: Text to cell phone: [phone]  Will anyone else be joining your video visit? No    Juliana Liu CMA      Video-Visit Details    Type of service:  Video Visit    Video Start Time: 1:36 PM  Video End Time: 1:38 PM    Originating Location (pt. Location): Home    Distant Location (provider location):  [facility] [place]     Platform used for Video Visit: Doximity    CC: Painful urination     HPI:  April Fischer is a 49 year old female who presents for consultation from Martha Gomez CNP for evaluation of the above.  Had an e coli UTI in April. Has received two courses of abx. Symptoms are better (urgency, frequency, pressure, some burning), but not resolved. Notes vaginal dryness. Questions if maybe the UTI was intercourse related.      No gross hematuria, pelvic or abdominal pain, hx of " "stones. Mom has a hx of kidney stones. Has done well in the interim. Thrilled with resolution of her symptoms. Used estrace cream for a few months.     Past Medical History:   Diagnosis Date     Adjustment disorder with mixed anxiety and depressed mood     meds \"[facility]\" on meds in past. (unsure type rx) Stopped4/06 declines restart.     Bipolar 2 disorder, major depressive episode (H)      Perimenopause      Weight gain      Current Outpatient Medications   Medication     estradiol (ESTRACE VAGINAL) 0.1 MG/GM vaginal cream     lamoTRIgine (LAMICTAL) 100 MG tablet     SRONYX 0.1-20 MG-MCG tablet     venlafaxine (EFFEXOR-ER) 75 MG TB24     No current facility-administered medications for this visit.      ROS: 10 pt ROS neg other than that noted in the HPI    PE: NAD, talks in full sentenced, EOMI    A/P: April Fiscehr is a 50 year old female with dysuria.  - Mycoplasma and Ureaplasma, UA/UC if symptoms  -Restart estrogen cream if sx arise, this will treat atrophic vaginitis and urethral changes which may impact her pelvic discomfort and improve her urinary urgency.    -Consider CT and cysto if sx persist.        Andree Brannon PA-C  [facility]      "

## 2020-09-18 NOTE — LETTER
"9/18/2020       RE: April Fischer  [address]   [place] [address]     Dear Colleague,    Thank you for referring your patient, April Fischer, to the [facility] [place] at [facility]. Please see a copy of my visit note below.    April Fischer is a 50 year old female who is being evaluated via a billable video visit.      The patient has been notified of following:     \"This video visit will be conducted via a call between you and your physician/provider. We have found that certain health care needs can be provided without the need for an in-person physical exam.  This service lets us provide the care you need with a video conversation.  If a prescription is necessary we can send it directly to your pharmacy.  If lab work is needed we can place an order for that and you can then stop by our lab to have the test done at a later time.    Video visits are billed at different rates depending on your insurance coverage.  Please reach out to your insurance provider with any questions.    If during the course of the call the physician/provider feels a video visit is not appropriate, you will not be charged for this service.\"    Patient has given verbal consent for Video visit? Yes  How would you like to obtain your AVS? MyChart  If you are dropped from the video visit, the video invite should be resent to: Text to cell phone: [phone]  Will anyone else be joining your video visit? No    Juliana Liu CMA      Video-Visit Details    Type of service:  Video Visit    Video Start Time: 1:36 PM  Video End Time: 1:38 PM    Originating Location (pt. Location): Home    Distant Location (provider location):  [facility] [place]     Platform used for Video Visit: Doximity    CC: Painful urination     HPI:  April Fischer is a 49 year old female who presents for consultation from Martha" "BELEM Gomez for evaluation of the above.  Had an e coli UTI in April. Has received two courses of abx. Symptoms are better (urgency, frequency, pressure, some burning), but not resolved. Notes vaginal dryness. Questions if maybe the UTI was intercourse related.      No gross hematuria, pelvic or abdominal pain, hx of stones. Mom has a hx of kidney stones. Has done well in the interim. Thrilled with resolution of her symptoms. Used estrace cream for a few months.     Past Medical History:   Diagnosis Date     Adjustment disorder with mixed anxiety and depressed mood     meds \"[facility]\" on meds in past. (unsure type rx) Stopped4/06 declines restart.     Bipolar 2 disorder, major depressive episode (H)      Perimenopause      Weight gain      Current Outpatient Medications   Medication     estradiol (ESTRACE VAGINAL) 0.1 MG/GM vaginal cream     lamoTRIgine (LAMICTAL) 100 MG tablet     SRONYX 0.1-20 MG-MCG tablet     venlafaxine (EFFEXOR-ER) 75 MG TB24     No current facility-administered medications for this visit.      ROS: 10 pt ROS neg other than that noted in the HPI    PE: NAD, talks in full sentenced, EOMI    A/P: April Fischer is a 50 year old female with dysuria.  - Mycoplasma and Ureaplasma, UA/UC if symptoms  -Restart estrogen cream if sx arise, this will treat atrophic vaginitis and urethral changes which may impact her pelvic discomfort and improve her urinary urgency.    -Consider CT and cysto if sx persist.        Andree Brannon PA-C  [facility]  "

## 2020-10-15 ENCOUNTER — OFFICE VISIT (OUTPATIENT)
Dept: MIDWIFE SERVICES | Facility: CLINIC | Age: 50
End: 2020-10-15
Payer: COMMERCIAL

## 2020-10-15 VITALS
HEART RATE: 109 BPM | TEMPERATURE: 97.8 F | BODY MASS INDEX: 32.55 KG/M2 | HEIGHT: 63 IN | SYSTOLIC BLOOD PRESSURE: 126 MMHG | WEIGHT: 183.7 LBS | DIASTOLIC BLOOD PRESSURE: 85 MMHG

## 2020-10-15 DIAGNOSIS — Z01.419 WOMEN'S ANNUAL ROUTINE GYNECOLOGICAL EXAMINATION: Primary | ICD-10-CM

## 2020-10-15 DIAGNOSIS — Z23 NEED FOR PROPHYLACTIC VACCINATION AND INOCULATION AGAINST INFLUENZA: ICD-10-CM

## 2020-10-15 PROCEDURE — 99396 PREV VISIT EST AGE 40-64: CPT | Mod: 25 | Performed by: ADVANCED PRACTICE MIDWIFE

## 2020-10-15 PROCEDURE — 90471 IMMUNIZATION ADMIN: CPT | Performed by: ADVANCED PRACTICE MIDWIFE

## 2020-10-15 PROCEDURE — 90686 IIV4 VACC NO PRSV 0.5 ML IM: CPT | Performed by: ADVANCED PRACTICE MIDWIFE

## 2020-10-15 RX ORDER — VENLAFAXINE HYDROCHLORIDE 150 MG/1
150 CAPSULE, EXTENDED RELEASE ORAL
COMMUNITY
Start: 2020-09-15 | End: 2021-09-15

## 2020-10-15 RX ORDER — LAMOTRIGINE 200 MG/1
TABLET ORAL
COMMUNITY
Start: 2020-07-29

## 2020-10-15 ASSESSMENT — MIFFLIN-ST. JEOR: SCORE: 1412.39

## 2020-10-15 NOTE — PROGRESS NOTES
"Chief Complaint   Patient presents with     Physical       Initial LMP 2020 (Exact Date)  Estimated body mass index is 31 kg/m  as calculated from the following:    Height as of 6/15/20: 1.6 m (5' 3\").    Weight as of 6/15/20: 79.4 kg (175 lb).  BP completed using cuff size: regular    Questioned patient about current smoking habits.  Pt. has never smoked.          The following HM Due: mammogram  colonoscopy/FIT      The following patient reported/Care Every where data was sent to:  P ABSTRACT QUALITY INITIATIVES [[id number]]  Colonoscopy done on this date: 2020 (approximately), by this group: [facility], results were Normal.                  "

## 2020-10-15 NOTE — PROGRESS NOTES
April is a 50 year old  female who presents for annual exam.     Menses are regular q 28-30 days and normal lasting 2 days.  Menses flow: normal and spotty.  No LMP recorded.. Using none for contraception.  She is not currently considering pregnancy.  Besides routine health maintenance, she has no other health concerns today .  GYNECOLOGIC HISTORY:  April is sexually active with a male partner(s) and is currently in monogamous relationship.    History sexually transmitted infections:No STD history  STI testing offered?  Declined  YOSSI exposure: No  History of abnormal Pap smear: NO - age 30-65 PAP every 5 years with negative HPV co-testing recommended  Family history of breast CA: Yes (Please explain): MGM  Family history of uterine/ovarian CA: No    Family history of colon CA: No    HEALTH MAINTENANCE:  Cholesterol: (  Cholesterol   Date Value Ref Range Status   2018 245 (H) <200 mg/dL Final     Comment:     Desirable:       <200 mg/dl   2016 228 (H) <200 mg/dL Final     Comment:     Desirable:       <200 mg/dl    History of abnormal lipids: Yes  Mammo: Yes . History of abnormal Mammo: No.  Regular Self Breast Exams: No  Calcium/Vitamin D intake: source:  none Adequate? Yes  TSH: (  TSH   Date Value Ref Range Status   2018 0.59 0.40 - 4.00 mU/L Final    )  Pap; (  Lab Results   Component Value Date    PAP NIL 2016    PAP NIL 2013    PAP NIL 2007    )    HISTORY:  OB History    Para Term  AB Living   5 3 2 1 2 3   SAB TAB Ectopic Multiple Live Births   1 1 0 0 3      # Outcome Date GA Lbr Humberto/2nd Weight Sex Delivery Anes PTL Lv   5  08 36w0d  3.033 kg (6 lb 11 oz) F    GUY      Birth Comments: Epidrual      Name: Emily   4 Term 07 38w0d  3.714 kg (8 lb 3 oz) M    GUY      Name: Zachary   3 SAB 06     SAB   DEC      Birth Comments: Complete   2 Term 00 39w0d 36:00 3.062 kg (6 lb 12 oz) F    GUY      Birth Comments: no  "compl. Water papules in back for pain relief      Name: Cecilia Benton TAB 01/01/92     TAB   DEC      Obstetric Comments   Therapeutic rest.     Past Medical History:   Diagnosis Date     Adjustment disorder with mixed anxiety and depressed mood     meds \"[facility]\" on meds in past. (unsure type rx) Stopped4/06 declines restart.     Bipolar 2 disorder, major depressive episode (H)      Perimenopause      Weight gain      Past Surgical History:   Procedure Laterality Date     NO HISTORY OF SURGERY       Family History   Problem Relation Age of Onset     Psychotic Disorder Mother         Many family members with depression.     Hypertension Maternal Grandmother      Breast Cancer Maternal Grandmother      Neurologic Disorder Maternal Grandmother      Anxiety Disorder Maternal Grandmother         MANY family members deal with anxiety     Mental Illness Maternal Grandmother         MANY family members     Depression Maternal Grandmother      Cerebrovascular Disease Maternal Grandfather      Diabetes Paternal Grandfather      Neurologic Disorder Maternal Aunt      Neurologic Disorder Maternal Aunt      Neurologic Disorder Maternal Uncle      Neurologic Disorder Brother      Genetic Disorder Daughter         cystic fibrosis     Anxiety Disorder Son      Mental Illness Son      Depression Son      Genetic Disorder Son         Transgender (born female, Cecilia)     Anxiety Disorder Cousin      Mental Illness Other      Depression Other      Social History     Socioeconomic History     Marital status:      Spouse name: Not on file     Number of children: 3     Years of education: Not on file     Highest education level: Not on file   Occupational History     Employer: SELF   Social Needs     Financial resource strain: Not on file     Food insecurity     Worry: Not on file     Inability: Not on file     Transportation needs     Medical: Not on file     Non-medical: Not on file   Tobacco Use     Smoking status: Never " Smoker     Smokeless tobacco: Never Used   Substance and Sexual Activity     Alcohol use: Yes     Alcohol/week: 0.0 standard drinks     Comment: 1-2 beers per week if anything     Drug use: No     Sexual activity: Yes     Partners: Male     Birth control/protection: Male Surgical   Lifestyle     Physical activity     Days per week: 5 days     Minutes per session: 30 min     Stress: Very much   Relationships     Social connections     Talks on phone: Not on file     Gets together: Not on file     Attends Jain service: Not on file     Active member of club or organization: Not on file     Attends meetings of clubs or organizations: Not on file     Relationship status: Not on file     Intimate partner violence     Fear of current or ex partner: Not on file     Emotionally abused: Not on file     Physically abused: Not on file     Forced sexual activity: Not on file   Other Topics Concern     Parent/sibling w/ CABG, MI or angioplasty before 65F 55M? Not Asked   Social History Narrative    , has an 19 yo with cystic fibrosis - all going to [employer] next week as a gift to this 19yo from the [employer].   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    Melania Taylor MD, PhD       Current Outpatient Medications:      lamoTRIgine (LAMICTAL) 100 MG tablet, Take 1.5 tablets (150 mg) by mouth 2 times daily, Disp: 60 tablet, Rfl: 3     venlafaxine (EFFEXOR-XR) 150 MG 24 hr capsule, Take 150 mg by mouth, Disp: , Rfl:    No Known Allergies    Past medical, surgical, social and family history were reviewed and updated in EPIC.    Lab results from [facility] reviewed and noted hx of elevated lipid panel.  Referral to Family Practice MD in [facility] for follow up and possible statin use.       ROS:   C:     NEGATIVE for fever, chills, change in weight  I:       NEGATIVE for worrisome rashes, moles or lesions  E:     NEGATIVE for vision changes or irritation  E/M: NEGATIVE for ear, mouth and throat  "problems  R:     NEGATIVE for significant cough or SOB  CV:   NEGATIVE for chest pain, palpitations or peripheral edema  GI:     NEGATIVE for nausea, abdominal pain, heartburn, or change in bowel habits  :   NEGATIVE for frequency, dysuria, hematuria, vaginal discharge, or irregular bleeding  M:     NEGATIVE for significant arthralgias or myalgia  N:      NEGATIVE for weakness, dizziness or paresthesias  E:      NEGATIVE for temperature intolerance, skin/hair changes  P:      NEGATIVE for changes in mood or affect.    EXAM:  /85 (BP Location: Left arm, Patient Position: Sitting, Cuff Size: Adult Regular)   Pulse 109   Temp 97.8  F (36.6  C) (Oral)   Ht 1.6 m (5' 3\")   Wt 83.3 kg (183 lb 11.2 oz)   LMP 09/14/2020 (Exact Date)   BMI 32.54 kg/m     BMI: Body mass index is 32.54 kg/m .  Constitutional: healthy, alert and no distress  Head: Normocephalic. No masses, lesions, tenderness or abnormalities  Neck: Neck supple. Trachea midline. No adenopathy. Thyroid symmetric, normal size.   Cardiovascular: RRR.   Respiratory: Negative.   Breast: No nodularity, asymmetry or nipple discharge bilaterally.  Gastrointestinal: Abdomen soft, non-tender, non-distended. No masses, organomegaly.  :  Vulva:  No external lesions, normal female hair distribution, no inguinal adenopathy.    Urethra:  Midline, non-tender, well supported, no discharge  Vagina:  Moist, pink, no abnormal discharge, no lesions  Uterus:  Normal size, non-tender, freely mobile  Ovaries:  No masses appreciated, non-tender, mobile  Rectal Exam: deferred  Musculoskeletal: extremities normal  Skin: no suspicious lesions or rashes  Psychiatric: Affect appropriate, cooperative,mentation appears normal.     COUNSELING:   Reviewed preventive health counseling, as reflected in patient instructions       Referral for lipid assessment       Regular exercise   Referral to therapist for counseling on home and family issues pt wants to address.     reports " that she has never smoked. She has never used smokeless tobacco.    Body mass index is 32.54 kg/m .  Weight management plan: Discussed healthy diet and exercise guidelines  FRAX Risk Assessment    ASSESSMENT:  50 year old female with satisfactory annual exam.    Pap is up to date and colonoscopy is done 9/23/20 as normal with single pylop  Noted.  Mammogram due 11/20    (Z23) Need for prophylactic vaccination and inoculation against influenza  Comment:   Plan: INFLUENZA VACCINE IM > 6 MONTHS VALENT IIV4         [41400], CANCELED: INFLUENZA QUAD, RECOMBINANT,        P-FREE (RIV4) (FLUBLOCK) [02674]

## 2021-03-27 ENCOUNTER — HEALTH MAINTENANCE LETTER (OUTPATIENT)
Age: 51
End: 2021-03-27

## 2021-03-31 ENCOUNTER — IMMUNIZATION (OUTPATIENT)
Dept: FAMILY MEDICINE | Facility: CLINIC | Age: 51
End: 2021-03-31
Payer: COMMERCIAL

## 2021-03-31 PROCEDURE — 0011A PR COVID VAC MODERNA 100 MCG/0.5 ML IM: CPT

## 2021-03-31 PROCEDURE — 91301 PR COVID VAC MODERNA 100 MCG/0.5 ML IM: CPT

## 2021-04-28 ENCOUNTER — IMMUNIZATION (OUTPATIENT)
Dept: FAMILY MEDICINE | Facility: CLINIC | Age: 51
End: 2021-04-28
Attending: FAMILY MEDICINE
Payer: COMMERCIAL

## 2021-04-28 PROCEDURE — 0012A PR COVID VAC MODERNA 100 MCG/0.5 ML IM: CPT

## 2021-04-28 PROCEDURE — 91301 PR COVID VAC MODERNA 100 MCG/0.5 ML IM: CPT

## 2021-09-05 ENCOUNTER — HEALTH MAINTENANCE LETTER (OUTPATIENT)
Age: 51
End: 2021-09-05

## 2021-12-26 ENCOUNTER — HEALTH MAINTENANCE LETTER (OUTPATIENT)
Age: 51
End: 2021-12-26

## 2022-04-17 ENCOUNTER — HEALTH MAINTENANCE LETTER (OUTPATIENT)
Age: 52
End: 2022-04-17

## 2022-10-23 ENCOUNTER — HEALTH MAINTENANCE LETTER (OUTPATIENT)
Age: 52
End: 2022-10-23

## 2023-01-01 ENCOUNTER — TRANSFERRED RECORDS (OUTPATIENT)
Dept: MULTI SPECIALTY CLINIC | Facility: CLINIC | Age: 53
End: 2023-01-01

## 2023-01-01 LAB — PAP SMEAR - HIM PATIENT REPORTED: NEGATIVE

## 2023-04-02 ENCOUNTER — HEALTH MAINTENANCE LETTER (OUTPATIENT)
Age: 53
End: 2023-04-02

## 2023-06-01 ENCOUNTER — HEALTH MAINTENANCE LETTER (OUTPATIENT)
Age: 53
End: 2023-06-01

## 2024-08-21 ENCOUNTER — ANCILLARY PROCEDURE (OUTPATIENT)
Dept: MAMMOGRAPHY | Facility: CLINIC | Age: 54
End: 2024-08-21
Payer: COMMERCIAL

## 2024-08-21 DIAGNOSIS — Z12.31 VISIT FOR SCREENING MAMMOGRAM: ICD-10-CM

## 2024-08-21 PROCEDURE — 77067 SCR MAMMO BI INCL CAD: CPT | Mod: GC | Performed by: RADIOLOGY

## 2024-08-21 PROCEDURE — 77063 BREAST TOMOSYNTHESIS BI: CPT | Mod: GC | Performed by: RADIOLOGY

## 2024-09-30 ENCOUNTER — TELEPHONE (OUTPATIENT)
Dept: FAMILY MEDICINE | Facility: CLINIC | Age: 54
End: 2024-09-30
Payer: COMMERCIAL

## 2024-09-30 NOTE — TELEPHONE ENCOUNTER
"Patient called to speak with someone about her current symptoms and ask what she should do about any of them.    Symptoms started 1 week ago today. Having moderate to severe coughing, dry cough, significant body aches \"from head to toe\", chest feels heavy.  No fevers, no wheezing or shortness of breath, no nausea or vomiting, no nasal congestion or drainage. Non-productive cough, no mucus. Not coughing up blood or specks or flecks.  Denies sore throat as well.   Very achey, can \"barely\" get out of bed. Just wants to lay in bed and not move.  Took 2 home tests for COVID, both turned out negative. Most recent test taken this morning.    Had symptoms start 1 week ago, started to feel better and then suddenly worsened. Feels the worst today than she has in past week. Weekend \"was not good\".    Advised that patient be seen in clinic today for further evaluation, would encourage testing for COVID, Influenza, etc. Recommend to have a provider listen to lung sounds.   Not a current [facility] patient. Saw primary care back in 2019. Has since established with [facility] in [place] ([facility]).   Advised for patient to come in to Urgent Care at [facility], opens at 10:00 AM and can walk-in, no appointment needed. Otherwise, can contact primary care clinic ([facility]) and inquire about any openings today or UC within their system. Encouraged to be seen and evaluated by a provider today, regardless.  Pt agreed with plan.          Kia Horne RN  Clinical Triage/Primary Care  [facility]    "

## 2024-10-01 ENCOUNTER — OFFICE VISIT (OUTPATIENT)
Dept: URGENT CARE | Facility: URGENT CARE | Age: 54
End: 2024-10-01
Payer: COMMERCIAL

## 2024-10-01 ENCOUNTER — ANCILLARY PROCEDURE (OUTPATIENT)
Dept: GENERAL RADIOLOGY | Facility: CLINIC | Age: 54
End: 2024-10-01
Payer: COMMERCIAL

## 2024-10-01 VITALS
DIASTOLIC BLOOD PRESSURE: 82 MMHG | WEIGHT: 191 LBS | HEART RATE: 102 BPM | SYSTOLIC BLOOD PRESSURE: 142 MMHG | RESPIRATION RATE: 18 BRPM | BODY MASS INDEX: 33.83 KG/M2 | OXYGEN SATURATION: 97 % | TEMPERATURE: 98 F

## 2024-10-01 DIAGNOSIS — J18.9 PNEUMONIA OF LEFT LUNG DUE TO INFECTIOUS ORGANISM, UNSPECIFIED PART OF LUNG: Primary | ICD-10-CM

## 2024-10-01 DIAGNOSIS — R05.1 ACUTE COUGH: ICD-10-CM

## 2024-10-01 PROCEDURE — 71046 X-RAY EXAM CHEST 2 VIEWS: CPT | Mod: TC | Performed by: STUDENT IN AN ORGANIZED HEALTH CARE EDUCATION/TRAINING PROGRAM

## 2024-10-01 PROCEDURE — 99203 OFFICE O/P NEW LOW 30 MIN: CPT

## 2024-10-01 RX ORDER — AZITHROMYCIN 250 MG/1
TABLET, FILM COATED ORAL
Qty: 6 TABLET | Refills: 0 | Status: SHIPPED | OUTPATIENT
Start: 2024-10-01 | End: 2024-10-06

## 2024-10-01 RX ORDER — AMOXICILLIN 500 MG/1
1000 CAPSULE ORAL 3 TIMES DAILY
Qty: 30 CAPSULE | Refills: 0 | Status: SHIPPED | OUTPATIENT
Start: 2024-10-01 | End: 2024-10-06

## 2024-10-01 RX ORDER — PRAZOSIN HYDROCHLORIDE 1 MG/1
CAPSULE ORAL
COMMUNITY
Start: 2024-09-18

## 2024-10-01 RX ORDER — TRAZODONE HYDROCHLORIDE 50 MG/1
25-50 TABLET, FILM COATED ORAL
COMMUNITY

## 2024-10-01 RX ORDER — ROSUVASTATIN CALCIUM 20 MG/1
1 TABLET, COATED ORAL DAILY
COMMUNITY
Start: 2023-07-25

## 2024-10-01 NOTE — PROGRESS NOTES
ASSESSMENT:  (J18.9) Pneumonia of left lung due to infectious organism, unspecified part of lung  (primary encounter diagnosis)  Plan: amoxicillin (AMOXIL) 500 MG capsule,         azithromycin (ZITHROMAX) 250 MG tablet    (R05.1) Acute cough  Plan: XR Chest 2 Views    PLAN:  Informed the patient that the chest x-ray shows the following per the radiologist report: Retrocardiac patchy opacity concerning for infection given  reported symptoms. Recommend short-term follow-up to ensure  resolution. Left perihilar nodular density measuring 1.3 cm may  reflect overlapping structures; attention on follow-up. No pleural  effusion or pneumothorax. Heart size is normal.  Pneumonia patient instructions discussed and provided.  Informed the patient to take the antibiotics as prescribed and finish the full course even if symptoms improve.  We discussed following up with her primary care provider in 7 to 10 days for recheck.  We also discussed going to the emergency department with any new or worsening symptoms.  Patient acknowledged her understanding of the above plan.    The use of Dragon/Watchsend dictation services may have been used to construct the content in this note; any grammatical or spelling errors are non-intentional. Please contact the author of this note directly if you are in need of any clarification.      Adolfo Dutta, JOSE LUIS CNP      SUBJECTIVE:   April Fischer is a 54 year old female presenting with a chief complaint of runny nose, stuffy nose, cough - productive, sore throat, body aches, and fatigue.  Onset of symptoms was 10 day(s) ago.  Course of illness is worsening.    Patient denies: fever, ear pain, vomiting, and diarrhea  Treatment measures tried include Tylenol  Predisposing factors include ill contact: Work.    ROS:  Negative except noted above.    OBJECTIVE:  BP (!) 142/82   Pulse 102   Temp 98  F (36.7  C) (Tympanic)   Resp 18   Wt 86.6 kg (191 lb)   SpO2 97%   BMI 33.83 kg/m     GENERAL APPEARANCE: healthy, alert and no distress  EYES: EOMI,  PERRL, conjunctiva clear  HENT: ear canals and TM's normal.  Nose and mouth without ulcers, erythema or lesions  NECK: supple, nontender, no lymphadenopathy  RESP: lungs clear to auscultation - no rales, rhonchi or wheezes  CV: regular rates and rhythm, normal S1 S2, no murmur noted  SKIN: no suspicious lesions or rashes    X-RAY: Chest x-ray shows the following per the radiologist report: Retrocardiac patchy opacity concerning for infection given  reported symptoms. Recommend short-term follow-up to ensure  resolution. Left perihilar nodular density measuring 1.3 cm may  reflect overlapping structures; attention on follow-up. No pleural  effusion or pneumothorax. Heart size is normal.

## 2024-10-01 NOTE — PATIENT INSTRUCTIONS
Chest x-ray shows the following per the radiologist report: Retrocardiac patchy opacity concerning for infection given reported symptoms. Recommend short-term follow-up to ensure resolution. Left perihilar nodular density measuring 1.3 cm may reflect overlapping structures; attention on follow-up. No pleural  effusion or pneumothorax. Heart size is normal.   Take the antibiotics as prescribed and finish the full course even if symptoms improve.  Follow-up with your primary care provider in 7 to 10 days for recheck.  Go to the emergency department with any new or worsening symptoms.

## 2024-10-11 ENCOUNTER — OFFICE VISIT (OUTPATIENT)
Dept: URGENT CARE | Facility: URGENT CARE | Age: 54
End: 2024-10-11
Payer: COMMERCIAL

## 2024-10-11 VITALS
HEART RATE: 87 BPM | SYSTOLIC BLOOD PRESSURE: 134 MMHG | RESPIRATION RATE: 19 BRPM | DIASTOLIC BLOOD PRESSURE: 79 MMHG | TEMPERATURE: 97.7 F | OXYGEN SATURATION: 97 %

## 2024-10-11 DIAGNOSIS — J98.01 BRONCHOSPASM: ICD-10-CM

## 2024-10-11 DIAGNOSIS — R53.81 MALAISE: Primary | ICD-10-CM

## 2024-10-11 LAB
BASOPHILS # BLD AUTO: 0 10E3/UL (ref 0–0.2)
BASOPHILS NFR BLD AUTO: 1 %
EOSINOPHIL # BLD AUTO: 0.1 10E3/UL (ref 0–0.7)
EOSINOPHIL NFR BLD AUTO: 2 %
ERYTHROCYTE [DISTWIDTH] IN BLOOD BY AUTOMATED COUNT: 13 % (ref 10–15)
HCT VFR BLD AUTO: 40.9 % (ref 35–47)
HGB BLD-MCNC: 13.7 G/DL (ref 11.7–15.7)
IMM GRANULOCYTES # BLD: 0 10E3/UL
IMM GRANULOCYTES NFR BLD: 0 %
LYMPHOCYTES # BLD AUTO: 2 10E3/UL (ref 0.8–5.3)
LYMPHOCYTES NFR BLD AUTO: 35 %
MCH RBC QN AUTO: 29.2 PG (ref 26.5–33)
MCHC RBC AUTO-ENTMCNC: 33.5 G/DL (ref 31.5–36.5)
MCV RBC AUTO: 87 FL (ref 78–100)
MONOCYTES # BLD AUTO: 0.4 10E3/UL (ref 0–1.3)
MONOCYTES NFR BLD AUTO: 6 %
NEUTROPHILS # BLD AUTO: 3.3 10E3/UL (ref 1.6–8.3)
NEUTROPHILS NFR BLD AUTO: 56 %
PLATELET # BLD AUTO: 275 10E3/UL (ref 150–450)
RBC # BLD AUTO: 4.69 10E6/UL (ref 3.8–5.2)
WBC # BLD AUTO: 5.9 10E3/UL (ref 4–11)

## 2024-10-11 PROCEDURE — 85025 COMPLETE CBC W/AUTO DIFF WBC: CPT | Performed by: PHYSICIAN ASSISTANT

## 2024-10-11 PROCEDURE — 36415 COLL VENOUS BLD VENIPUNCTURE: CPT | Performed by: PHYSICIAN ASSISTANT

## 2024-10-11 PROCEDURE — 99203 OFFICE O/P NEW LOW 30 MIN: CPT | Performed by: PHYSICIAN ASSISTANT

## 2024-10-11 RX ORDER — PREDNISONE 20 MG/1
40 TABLET ORAL DAILY
Qty: 10 TABLET | Refills: 0 | Status: SHIPPED | OUTPATIENT
Start: 2024-10-11 | End: 2024-10-16

## 2024-10-11 ASSESSMENT — ENCOUNTER SYMPTOMS
FATIGUE: 1
CHEST TIGHTNESS: 1
HEADACHES: 1

## 2024-11-26 ASSESSMENT — PATIENT HEALTH QUESTIONNAIRE - PHQ9: SUM OF ALL RESPONSES TO PHQ QUESTIONS 1-9: 15

## 2025-02-03 NOTE — PROGRESS NOTES
"SUBJECTIVE:   April Fischer is a 54 year old female presenting with a chief complaint of   Chief Complaint   Patient presents with    Urgent Care     c/o dry cough, heavy chest, and fatigue - reports she was dx with pneumonia on 10/01/2024.        She is an established patient of [facility].  Patient presents with complaints of cough, heavy chest and fatigue.  Patient was here 11 days ago and diagnosed with pneumonia to left lung and treated with amoxicillin and azithromax.   Finished abx last week.  Went back to work this week and feel ill.    Treatment:  tylenol and cough gtts    Review of Systems   Constitutional:  Positive for fatigue.   Respiratory:  Positive for chest tightness.    Neurological:  Positive for headaches.   All other systems reviewed and are negative.      Past Medical History:   Diagnosis Date    Adjustment disorder with mixed anxiety and depressed mood     meds \"[facility]\" on meds in past. (unsure type rx) Stopped4/06 declines restart.    Bipolar 2 disorder, major depressive episode (H)     Perimenopause     Weight gain      Family History   Problem Relation Age of Onset    Psychotic Disorder Mother         Many family members with depression.    Hypertension Maternal Grandmother     Breast Cancer Maternal Grandmother     Neurologic Disorder Maternal Grandmother     Anxiety Disorder Maternal Grandmother         MANY family members deal with anxiety    Mental Illness Maternal Grandmother         MANY family members    Depression Maternal Grandmother     Cerebrovascular Disease Maternal Grandfather     Diabetes Paternal Grandfather     Neurologic Disorder Maternal Aunt     Neurologic Disorder Maternal Aunt     Neurologic Disorder Maternal Uncle     Neurologic Disorder Brother     Genetic Disorder Daughter         cystic fibrosis    Anxiety Disorder Son     Mental Illness Son     Depression Son     Genetic Disorder Son         Transgender (born female, Cecilia)    Anxiety Disorder Cousin  " HCG order pended.       Mental Illness Other     Depression Other      Current Outpatient Medications   Medication Sig Dispense Refill    lamoTRIgine (LAMICTAL) 200 MG tablet TAKE 1 TABLET BY MOUTH TWICE A DAY      prazosin (MINIPRESS) 1 MG capsule TAKE 1 CAPSULE BY MOUTH TWICE DAILY WATCH FOR DIZZINESS      predniSONE (DELTASONE) 20 MG tablet Take 2 tablets (40 mg) by mouth daily for 5 days. 10 tablet 0    rosuvastatin (CRESTOR) 20 MG tablet Take 1 tablet by mouth daily.      traZODone (DESYREL) 50 MG tablet Take 25-50 mg by mouth nightly as needed for sleep.      venlafaxine (EFFEXOR-XR) 150 MG 24 hr capsule Take 150 mg by mouth       Social History     Tobacco Use    Smoking status: Never    Smokeless tobacco: Never   Substance Use Topics    Alcohol use: Yes     Alcohol/week: 0.0 standard drinks of alcohol     Comment: 1-2 beers per week if anything       OBJECTIVE  /79   Pulse 87   Temp 97.7  F (36.5  C) (Tympanic)   Resp 19   SpO2 97%     Physical Exam  Vitals and nursing note reviewed.   Constitutional:       Appearance: Normal appearance. She is normal weight.   HENT:      Head: Normocephalic and atraumatic.      Right Ear: Tympanic membrane, ear canal and external ear normal.      Left Ear: Tympanic membrane, ear canal and external ear normal.      Nose: Nose normal.      Mouth/Throat:      Mouth: Mucous membranes are moist.      Pharynx: Oropharynx is clear.   Eyes:      Extraocular Movements: Extraocular movements intact.      Conjunctiva/sclera: Conjunctivae normal.   Cardiovascular:      Rate and Rhythm: Normal rate and regular rhythm.      Pulses: Normal pulses.      Heart sounds: Normal heart sounds.   Pulmonary:      Effort: Pulmonary effort is normal.      Breath sounds: Normal breath sounds. No wheezing, rhonchi or rales.   Musculoskeletal:      Cervical back: Normal range of motion.   Skin:     General: Skin is warm and dry.      Findings: No rash.   Neurological:      General: No focal deficit present.       Mental Status: She is alert.   Psychiatric:         Mood and Affect: Mood normal.         Behavior: Behavior normal.         Labs:  Results for orders placed or performed in visit on 10/11/24 (from the past 24 hour(s))   CBC with platelets and differential    Narrative    The following orders were created for panel order CBC with platelets and differential.  Procedure                               Abnormality         Status                     ---------                               -----------         ------                     CBC with platelets and d...[[id number]]                      Final result                 Please view results for these tests on the individual orders.   CBC with platelets and differential   Result Value Ref Range    WBC Count 5.9 4.0 - 11.0 10e3/uL    RBC Count 4.69 3.80 - 5.20 10e6/uL    Hemoglobin 13.7 11.7 - 15.7 g/dL    Hematocrit 40.9 35.0 - 47.0 %    MCV 87 78 - 100 fL    MCH 29.2 26.5 - 33.0 pg    MCHC 33.5 31.5 - 36.5 g/dL    RDW 13.0 10.0 - 15.0 %    Platelet Count 275 150 - 450 10e3/uL    % Neutrophils 56 %    % Lymphocytes 35 %    % Monocytes 6 %    % Eosinophils 2 %    % Basophils 1 %    % Immature Granulocytes 0 %    Absolute Neutrophils 3.3 1.6 - 8.3 10e3/uL    Absolute Lymphocytes 2.0 0.8 - 5.3 10e3/uL    Absolute Monocytes 0.4 0.0 - 1.3 10e3/uL    Absolute Eosinophils 0.1 0.0 - 0.7 10e3/uL    Absolute Basophils 0.0 0.0 - 0.2 10e3/uL    Absolute Immature Granulocytes 0.0 <=0.4 10e3/uL       ASSESSMENT:      ICD-10-CM    1. Malaise  R53.81 CBC with platelets and differential     CBC with platelets and differential      2. Bronchospasm  J98.01 predniSONE (DELTASONE) 20 MG tablet           Medical Decision Making:    Differential Diagnosis:  URI Adult/Peds:  Post pneumonia, Viral syndrome and Viral upper respiratory illness    Serious Comorbid Conditions:  Adult:   reviewed    PLAN:    Rx for prednisone.  Patient education.  Discussed reasons to seek immediate medical attention.   Additionally if no improvement or worsening in one week, may follow up with PCP and/or UC.    Discussed expected course.      Followup:    If not improving or if condition worsens, follow up with your Primary Care Provider, If not improving or if conditions worsens over the next 12-24 hours, go to the Emergency Department    There are no Patient Instructions on file for this visit.

## 2025-07-16 NOTE — PATIENT INSTRUCTIONS
Patient Education   Preventive Care Advice   This is general advice given by our system to help you stay healthy. However, your care team may have specific advice just for you. Please talk to your care team about your preventive care needs.  Nutrition  Eat 5 or more servings of fruits and vegetables each day.  Try wheat bread, brown rice and whole grain pasta (instead of white bread, rice, and pasta).  Get enough calcium and vitamin D. Check the label on foods and aim for 100% of the RDA (recommended daily allowance).  Lifestyle  Exercise at least 150 minutes each week  (30 minutes a day, 5 days a week).  Do muscle strengthening activities 2 days a week. These help control your weight and prevent disease.  No smoking.  Wear sunscreen to prevent skin cancer.  Have a dental exam and cleaning every 6 months.  Yearly exams  See your health care team every year to talk about:  Any changes in your health.  Any medicines your care team has prescribed.  Preventive care, family planning, and ways to prevent chronic diseases.  Shots (vaccines)   HPV shots (up to age 26), if you've never had them before.  Hepatitis B shots (up to age 59), if you've never had them before.  COVID-19 shot: Get this shot when it's due.  Flu shot: Get a flu shot every year.  Tetanus shot: Get a tetanus shot every 10 years.  Pneumococcal, hepatitis A, and RSV shots: Ask your care team if you need these based on your risk.  Shingles shot (for age 50 and up)  General health tests  Diabetes screening:  Starting at age 35, Get screened for diabetes at least every 3 years.  If you are younger than age 35, ask your care team if you should be screened for diabetes.  Cholesterol test: At age 39, start having a cholesterol test every 5 years, or more often if advised.  Bone density scan (DEXA): At age 50, ask your care team if you should have this scan for osteoporosis (brittle bones).  Hepatitis C: Get tested at least once in your life.  STIs (sexually  transmitted infections)  Before age 24: Ask your care team if you should be screened for STIs.  After age 24: Get screened for STIs if you're at risk. You are at risk for STIs (including HIV) if:  You are sexually active with more than one person.  You don't use condoms every time.  You or a partner was diagnosed with a sexually transmitted infection.  If you are at risk for HIV, ask about PrEP medicine to prevent HIV.  Get tested for HIV at least once in your life, whether you are at risk for HIV or not.  Cancer screening tests  Cervical cancer screening: If you have a cervix, begin getting regular cervical cancer screening tests starting at age 21.  Breast cancer scan (mammogram): If you've ever had breasts, begin having regular mammograms starting at age 40. This is a scan to check for breast cancer.  Colon cancer screening: It is important to start screening for colon cancer at age 45.  Have a colonoscopy test every 10 years (or more often if you're at risk) Or, ask your provider about stool tests like a FIT test every year or Cologuard test every 3 years.  To learn more about your testing options, visit:   .  For help making a decision, visit:   https://bit.ly/dy12396.  Prostate cancer screening test: If you have a prostate, ask your care team if a prostate cancer screening test (PSA) at age 55 is right for you.  Lung cancer screening: If you are a current or former smoker ages 50 to 80, ask your care team if ongoing lung cancer screenings are right for you.  For informational purposes only. Not to replace the advice of your health care provider. Copyright   2023 [employer]. All rights reserved. Clinically reviewed by the [facility] Transitions Program. [employer] 786182 - REV 01/24.

## 2025-07-23 ENCOUNTER — OFFICE VISIT (OUTPATIENT)
Dept: FAMILY MEDICINE | Facility: CLINIC | Age: 55
End: 2025-07-23
Payer: COMMERCIAL

## 2025-07-23 VITALS
SYSTOLIC BLOOD PRESSURE: 124 MMHG | RESPIRATION RATE: 14 BRPM | BODY MASS INDEX: 33.66 KG/M2 | HEIGHT: 63 IN | DIASTOLIC BLOOD PRESSURE: 80 MMHG | TEMPERATURE: 98.2 F | WEIGHT: 190 LBS | HEART RATE: 94 BPM | OXYGEN SATURATION: 98 %

## 2025-07-23 DIAGNOSIS — Z12.11 SCREEN FOR COLON CANCER: ICD-10-CM

## 2025-07-23 DIAGNOSIS — M79.604 PAIN OF RIGHT LOWER EXTREMITY: ICD-10-CM

## 2025-07-23 DIAGNOSIS — G47.33 OSA (OBSTRUCTIVE SLEEP APNEA): ICD-10-CM

## 2025-07-23 DIAGNOSIS — E78.2 MIXED HYPERLIPIDEMIA: ICD-10-CM

## 2025-07-23 DIAGNOSIS — N92.6 IRREGULAR PERIODS: ICD-10-CM

## 2025-07-23 DIAGNOSIS — Z00.00 ROUTINE GENERAL MEDICAL EXAMINATION AT A HEALTH CARE FACILITY: Primary | ICD-10-CM

## 2025-07-23 DIAGNOSIS — E66.811 CLASS 1 OBESITY: ICD-10-CM

## 2025-07-23 DIAGNOSIS — Z12.31 VISIT FOR SCREENING MAMMOGRAM: ICD-10-CM

## 2025-07-23 PROBLEM — F33.1 MODERATE EPISODE OF RECURRENT MAJOR DEPRESSIVE DISORDER (H): Status: ACTIVE | Noted: 2022-06-29

## 2025-07-23 PROBLEM — F41.1 GAD (GENERALIZED ANXIETY DISORDER): Status: ACTIVE | Noted: 2017-02-20

## 2025-07-23 PROBLEM — F43.10 PTSD (POST-TRAUMATIC STRESS DISORDER): Status: ACTIVE | Noted: 2021-03-15

## 2025-07-23 PROBLEM — Z71.89 ADVANCED DIRECTIVES, COUNSELING/DISCUSSION: Status: ACTIVE | Noted: 2025-07-23

## 2025-07-23 LAB
ANION GAP SERPL CALCULATED.3IONS-SCNC: 10 MMOL/L (ref 7–15)
BUN SERPL-MCNC: 10.4 MG/DL (ref 6–20)
CALCIUM SERPL-MCNC: 9.9 MG/DL (ref 8.8–10.4)
CHLORIDE SERPL-SCNC: 103 MMOL/L (ref 98–107)
CHOLEST SERPL-MCNC: 302 MG/DL
CREAT SERPL-MCNC: 0.79 MG/DL (ref 0.51–0.95)
EGFRCR SERPLBLD CKD-EPI 2021: 87 ML/MIN/1.73M2
FASTING STATUS PATIENT QL REPORTED: NO
FASTING STATUS PATIENT QL REPORTED: NO
FSH SERPL IRP2-ACNC: 19.6 MIU/ML
GLUCOSE SERPL-MCNC: 70 MG/DL (ref 70–99)
HCO3 SERPL-SCNC: 26 MMOL/L (ref 22–29)
HDLC SERPL-MCNC: 52 MG/DL
LDLC SERPL CALC-MCNC: 208 MG/DL
NONHDLC SERPL-MCNC: 250 MG/DL
POTASSIUM SERPL-SCNC: 4.1 MMOL/L (ref 3.4–5.3)
SODIUM SERPL-SCNC: 139 MMOL/L (ref 135–145)
TRIGL SERPL-MCNC: 211 MG/DL

## 2025-07-23 PROCEDURE — 99214 OFFICE O/P EST MOD 30 MIN: CPT | Mod: 25 | Performed by: PHYSICIAN ASSISTANT

## 2025-07-23 PROCEDURE — 80061 LIPID PANEL: CPT | Performed by: PHYSICIAN ASSISTANT

## 2025-07-23 PROCEDURE — 3074F SYST BP LT 130 MM HG: CPT | Performed by: PHYSICIAN ASSISTANT

## 2025-07-23 PROCEDURE — 80048 BASIC METABOLIC PNL TOTAL CA: CPT | Performed by: PHYSICIAN ASSISTANT

## 2025-07-23 PROCEDURE — 83001 ASSAY OF GONADOTROPIN (FSH): CPT | Performed by: PHYSICIAN ASSISTANT

## 2025-07-23 PROCEDURE — 99386 PREV VISIT NEW AGE 40-64: CPT | Performed by: PHYSICIAN ASSISTANT

## 2025-07-23 PROCEDURE — G2211 COMPLEX E/M VISIT ADD ON: HCPCS | Performed by: PHYSICIAN ASSISTANT

## 2025-07-23 PROCEDURE — 36415 COLL VENOUS BLD VENIPUNCTURE: CPT | Performed by: PHYSICIAN ASSISTANT

## 2025-07-23 PROCEDURE — 1126F AMNT PAIN NOTED NONE PRSNT: CPT | Performed by: PHYSICIAN ASSISTANT

## 2025-07-23 PROCEDURE — 3079F DIAST BP 80-89 MM HG: CPT | Performed by: PHYSICIAN ASSISTANT

## 2025-07-23 RX ORDER — PRAZOSIN HYDROCHLORIDE 1 MG/1
1 CAPSULE ORAL AT BEDTIME
COMMUNITY

## 2025-07-23 RX ORDER — ROSUVASTATIN CALCIUM 20 MG/1
20 TABLET, COATED ORAL DAILY
Qty: 90 TABLET | Refills: 3 | Status: SHIPPED | OUTPATIENT
Start: 2025-07-23

## 2025-07-23 SDOH — HEALTH STABILITY: PHYSICAL HEALTH: ON AVERAGE, HOW MANY DAYS PER WEEK DO YOU ENGAGE IN MODERATE TO STRENUOUS EXERCISE (LIKE A BRISK WALK)?: 4 DAYS

## 2025-07-23 SDOH — HEALTH STABILITY: PHYSICAL HEALTH: ON AVERAGE, HOW MANY MINUTES DO YOU ENGAGE IN EXERCISE AT THIS LEVEL?: 30 MIN

## 2025-07-23 ASSESSMENT — PATIENT HEALTH QUESTIONNAIRE - PHQ9
10. IF YOU CHECKED OFF ANY PROBLEMS, HOW DIFFICULT HAVE THESE PROBLEMS MADE IT FOR YOU TO DO YOUR WORK, TAKE CARE OF THINGS AT HOME, OR GET ALONG WITH OTHER PEOPLE: EXTREMELY DIFFICULT
SUM OF ALL RESPONSES TO PHQ QUESTIONS 1-9: 11
SUM OF ALL RESPONSES TO PHQ QUESTIONS 1-9: 11

## 2025-07-23 ASSESSMENT — PAIN SCALES - GENERAL: PAINLEVEL_OUTOF10: NO PAIN (0)

## 2025-07-23 ASSESSMENT — SOCIAL DETERMINANTS OF HEALTH (SDOH): HOW OFTEN DO YOU GET TOGETHER WITH FRIENDS OR RELATIVES?: TWICE A WEEK

## 2025-07-23 NOTE — Clinical Note
Please abstract the following data from this visit with this patient into the appropriate field in Epic:  Tests that can be patient reported without a hard copy:  Pap smear done on this date: 01/01/2023 (approximately), by this group: [facility], results were Normal.   Other Tests found in the patient's chart through Chart Review/Care Everywhere:  Note to Abstraction: If this section is blank, no results were found via Chart Review/Care Everywhere.

## 2025-07-23 NOTE — PROGRESS NOTES
Preventive Care Visit  [facility] MANOJ Humphrey PA-C, Physician Assistant - Medical  Jul 23, 2025        Assessment & Plan     Routine general medical examination at a health care facility  New establish care with me today, previous outside [place] patient.  Reviewed health history and family history today    Irregular periods  Patient notes going over a year with a period in past, then some spotting and finally some heavy bleeding with clots recently.  - US Pelvic Complete with Transvaginal; Future  - Follicle stimulating hormone; Future  Due to condensed timeframe, unclear whether she is menopausal/postmenopausal with history of irregular periods or not, will obtain some labs and pending outcome, have US ordered.  Patient with concern over cancer, we discussed typical appearance, age.    Class 1 obesity  ongoing  - Basic metabolic panel  (Ca, Cl, CO2, Creat, Gluc, K, Na, BUN); Future  - tirzepatide-Weight Management (ZEPBOUND) 2.5 MG/0.5ML prefilled pen; Inject 0.5 mLs (2.5 mg) subcutaneously every 7 days.  Already on diet, eating healthy, trying to exercise often. Not losing weight.  Wants to try meds after trying conservative approach previously.  expected course of disease discussed with patient.  Side effects of medications reviewed    Mixed hyperlipidemia  Ongoing   - Lipid panel reflex to direct LDL Fasting; Future  - rosuvastatin (CRESTOR) 20 MG tablet; Take 1 tablet (20 mg) by mouth daily.  - Lipid panel reflex to direct LDL Fasting  Was off of meds recently, I reordered for her to restart. Has family history CAD, suggest remaining on it. Target LDL under 100    Pain of right lower extremity  Appears to be MSK in nature.  Conservative measures suggested     FLORINDA (obstructive sleep apnea)  Has not been using cpap at all times, will restart  - tirzepatide-Weight Management (ZEPBOUND) 2.5 MG/0.5ML prefilled pen; Inject 0.5 mLs (2.5 mg) subcutaneously every 7 days.    Visit for  "screening mammogram  Ordered, due 8/21/25 repeat   - MA Screen Bilateral w/Juan A; Future    Screen for colon cancer  Ordered, previously may have had polyps. Unable to review report from around 5 years ago per pt  - Colonoscopy Screening  Referral; Future    The longitudinal plan of care for the diagnosis(es)/condition(s) as documented were addressed during this visit. Due to the added complexity in care, I will continue to support April in the subsequent management and with ongoing continuity of care.        BMI  Estimated body mass index is 33.66 kg/m  as calculated from the following:    Height as of this encounter: 1.6 m (5' 3\").    Weight as of this encounter: 86.2 kg (190 lb).   Weight management plan: Discussed healthy diet and exercise guidelines    Depression Screening Follow Up        7/23/2025     9:43 AM   PHQ   PHQ-9 Total Score 11    Q9: Thoughts of better off dead/self-harm past 2 weeks Not at all       Patient-reported           Follow Up Actions Taken  Crisis resource information provided in After Visit Summary     Counseling  Appropriate preventive services were addressed with this patient via screening, questionnaire, or discussion as appropriate for fall prevention, nutrition, physical activity, Tobacco-use cessation, social engagement, weight loss and cognition.  Checklist reviewing preventive services available has been given to the patient.  Reviewed patient's diet, addressing concerns and/or questions.   She is at risk for psychosocial distress and has been provided with information to reduce risk.       Follow up dependent on labs, imaging       Subjective   April is a 54 year old, presenting for the following:  Physical and Establish Care          7/23/2025     9:40 AM   Additional Questions   Roomed by Anyi De Santiago MA   Accompanied by Self        Per pt Pa was completed @ [facility]/01/2023 results were normal. Will abstract into charts.    HPI     Advance Care " Planning    Discussed advance care planning with patient; however, patient declined at this time.        7/23/2025   General Health   How would you rate your overall physical health? Good   Feel stress (tense, anxious, or unable to sleep) Rather much   (!) STRESS CONCERN      7/23/2025   Nutrition   Three or more servings of calcium each day? Yes   Diet: Regular (no restrictions)    Carbohydrate counting   How many servings of fruit and vegetables per day? (!) 2-3   How many sweetened beverages each day? 0-1       Multiple values from one day are sorted in reverse-chronological order         7/23/2025   Exercise   Days per week of moderate/strenous exercise 4 days   Average minutes spent exercising at this level 30 min         7/23/2025   Social Factors   Frequency of gathering with friends or relatives Twice a week   Worry food won't last until get money to buy more No   Food not last or not have enough money for food? No   Do you have housing? (Housing is defined as stable permanent housing and does not include staying outside in a car, in a tent, in an abandoned building, in an overnight shelter, or couch-surfing.) Yes   Are you worried about losing your housing? No   Lack of transportation? No   Unable to get utilities (heat,electricity)? No         7/23/2025   Fall Risk   Fallen 2 or more times in the past year? No   Trouble with walking or balance? No          7/23/2025   Dental   Dentist two times every year? Yes       Today's PHQ-9 Score:       7/23/2025     9:43 AM   PHQ-9 SCORE   PHQ-9 Total Score MyChart 11 (Moderate depression)   PHQ-9 Total Score 11        Patient-reported         7/23/2025   Substance Use   Alcohol more than 3/day or more than 7/wk No   Do you use any other substances recreationally? No     Social History     Tobacco Use    Smoking status: Never    Smokeless tobacco: Never   Vaping Use    Vaping status: Never Used   Substance Use Topics    Alcohol use: Yes     Comment: 1-2 beers per  "week if anything    Drug use: No           8/21/2024   LAST FHS-7 RESULTS   1st degree relative breast or ovarian cancer No   Any relative bilateral breast cancer No   Any male have breast cancer No   Any ONE woman have BOTH breast AND ovarian cancer No   Any woman with breast cancer before 50yrs Yes   2 or more relatives with breast AND/OR ovarian cancer Yes   2 or more relatives with breast AND/OR bowel cancer No        Mammogram Screening - Mammogram every 1-2 years updated in Health Maintenance based on mutual decision making        7/23/2025   STI Screening   New sexual partner(s) since last STI/HIV test? No     History of abnormal Pap smear: No - age 30- 64 PAP with HPV every 5 years recommended        Latest Ref Rng & Units 11/17/2016    10:56 AM 11/17/2016    12:00 AM 12/18/2013    12:00 AM   PAP / HPV   PAP (Historical)   NIL  NIL    HPV 16 DNA NEG Negative      HPV 18 DNA NEG Negative      Other HR HPV NEG Negative        ASCVD Risk   The ASCVD Risk score (Miquel TAVERAS, et al., 2019) failed to calculate for the following reasons:    Cannot find a previous HDL lab    Cannot find a previous total cholesterol lab           Reviewed and updated as needed this visit by Provider   Tobacco  Allergies  Meds  Problems  Med Hx  Surg Hx  Fam Hx            Past Medical History:   Diagnosis Date    Adjustment disorder with mixed anxiety and depressed mood     meds \"[facility]\" on meds in past. (unsure type rx) Stopped4/06 declines restart.    Bipolar 2 disorder, major depressive episode (H)     Depressive disorder Early 1990    Diagnosed bipolar in 2012    Perimenopause     Weight gain      Past Surgical History:   Procedure Laterality Date    IR LUMBAR PUNCTURE  1/26/2024    IR LUMBAR PUNCTURE  11/10/2023    IR LUMBAR PUNCTURE  9/29/2023    NO HISTORY OF SURGERY       Lab work is in process  Labs reviewed in EPIC         Objective    Exam  /80   Pulse 94   Temp 98.2  F (36.8  C) (Tympanic)   " "Resp 14   Ht 1.6 m (5' 3\")   Wt 86.2 kg (190 lb)   LMP  (LMP Unknown)   SpO2 98%   Breastfeeding No   BMI 33.66 kg/m     Estimated body mass index is 33.66 kg/m  as calculated from the following:    Height as of this encounter: 1.6 m (5' 3\").    Weight as of this encounter: 86.2 kg (190 lb).    Physical Exam  GENERAL: alert and no distress  EYES: Eyes grossly normal to inspection, PERRL and conjunctivae and sclerae normal  SKIN: no suspicious lesions or rashes  PSYCH: mentation appears normal, affect normal/bright        Signed Electronically by: Baldemar Humphrey PA-C    "

## 2025-07-24 ENCOUNTER — PATIENT OUTREACH (OUTPATIENT)
Dept: CARE COORDINATION | Facility: CLINIC | Age: 55
End: 2025-07-24
Payer: COMMERCIAL

## 2025-07-24 ENCOUNTER — TELEPHONE (OUTPATIENT)
Dept: FAMILY MEDICINE | Facility: CLINIC | Age: 55
End: 2025-07-24
Payer: COMMERCIAL

## 2025-07-24 DIAGNOSIS — E66.811 CLASS 1 OBESITY: ICD-10-CM

## 2025-07-24 DIAGNOSIS — G47.33 OSA (OBSTRUCTIVE SLEEP APNEA): ICD-10-CM

## 2025-07-24 NOTE — TELEPHONE ENCOUNTER
Prior Authorization Retail Medication Request    Medication/Dose: tirzepatide-Weight Management (ZEPBOUND) 2.5 MG/0.5ML prefilled pen [[id number]]  Diagnosis and ICD code (if different than what is on RX):  FLORINDA (obstructive sleep apnea) [G47.33]  Class 1 obesity [E66.811]    New/renewal/insurance change PA/secondary ins. PA:  Previously Tried and Failed:    Rationale:      Insurance   Primary: [employer]  Insurance ID:  [id number]     Secondary (if applicable):[facility]  Insurance ID:  [id number]     Pharmacy Information (if different than what is on RX)  Name:  [facility]  Phone:  [phone]  Fax:    Clinic Information  Preferred routing pool for dept communication:  [id number]

## 2025-07-28 ENCOUNTER — ANCILLARY PROCEDURE (OUTPATIENT)
Dept: ULTRASOUND IMAGING | Facility: CLINIC | Age: 55
End: 2025-07-28
Attending: PHYSICIAN ASSISTANT
Payer: COMMERCIAL

## 2025-07-28 DIAGNOSIS — N92.6 IRREGULAR PERIODS: ICD-10-CM

## 2025-07-28 PROCEDURE — 76856 US EXAM PELVIC COMPLETE: CPT | Mod: TC | Performed by: STUDENT IN AN ORGANIZED HEALTH CARE EDUCATION/TRAINING PROGRAM

## 2025-07-28 PROCEDURE — 76830 TRANSVAGINAL US NON-OB: CPT | Mod: TC | Performed by: STUDENT IN AN ORGANIZED HEALTH CARE EDUCATION/TRAINING PROGRAM

## 2025-07-28 NOTE — TELEPHONE ENCOUNTER
Patient calling back and spoke with insurance company. Insurance company reports will cover medication for patient. Patient is awaiting prior authorization for the medication. Prior authorization was sent to the team on 07/24/2025. Discussed allowing prior authorization team 7-10 business days to hear an answer. Medication should be transferred to [facility].     Patient requesting prior authorization team to call insurance number [phone]. Discussed will send the message with patient. Transferred prescription to [facility].    Pita Naranjo RN

## 2025-07-28 NOTE — TELEPHONE ENCOUNTER
Patient calling and reports prior authorization was denied for the Zepbound. Do not see the denial in our system, yet. Patient requesting to send a different medication.    Discussed calling insurance to see if they will cover any of the weight loss medications. Patient frustrated with plan of care. Will call with an update of medications that insurance will cover.    Pita Naranjo RN

## 2025-07-28 NOTE — TELEPHONE ENCOUNTER
Still don't see denial in our system. This would help to know rationale as to why not covered or if her insurance has an exclusion. We discussed all of this in visit already.    Baldemar Humphrey PA-C

## 2025-07-29 ENCOUNTER — TELEPHONE (OUTPATIENT)
Dept: FAMILY MEDICINE | Facility: CLINIC | Age: 55
End: 2025-07-29
Payer: COMMERCIAL

## 2025-07-29 NOTE — TELEPHONE ENCOUNTER
PA Initiation    Medication: ZEPBOUND 2.5 MG/0.5ML SC SOAJ  Insurance Company: [facility] Non-Specialty PA's - Phone [phone] Fax [phone]  Pharmacy Filling the Rx: [facility] - [place], [place] - [address] [place] [address]  Filling Pharmacy Phone: [phone]  Filling Pharmacy Fax: [phone]  Start Date: 7/29/2025

## 2025-07-29 NOTE — TELEPHONE ENCOUNTER
PRIOR AUTHORIZATION DENIED    Medication: ZEPBOUND 2.5 MG/0.5ML SC SOAJ    Insurance Company: [facility] Non-Specialty PA's - Phone [phone] Fax [phone]    Denial Date: 7/29/2025    Denial Reason(s): Patient AHI is not at least 15 events per hour.     Appeal Information:

## 2025-07-29 NOTE — TELEPHONE ENCOUNTER
"Patient calling in regards to her ultrasound. She was wanting to see if results were in. Writer notes that her ultrasound status is set to \"Exam Ended\", with no provider notes on any kind of results. Patient stated she was a bit anxious and wanting the message to be sent to covering providers to see if they can relay results since she stated she knows that her PCP is not in the office today.    Routing to covering providers to review and advise.    Scott Loera RN  [facility]    "

## 2025-07-30 ENCOUNTER — PATIENT OUTREACH (OUTPATIENT)
Dept: CARE COORDINATION | Facility: CLINIC | Age: 55
End: 2025-07-30
Payer: COMMERCIAL

## 2025-07-30 NOTE — PATIENT INSTRUCTIONS
If you have any questions regarding your visit, Please contact your care team.     [employer]: [phone]    To Schedule an Appointment 24/7  Call: [phone]     Rafi Lemos MD  Medical Director    Yara Torres-RN  Roxie-RN  Naomi Eugene-Surgery Scheduler  Sallie-       Tuesday-[place]  7:30 a.m-4:30 p.m    Wednesday-[place] 7:30 a.m-4:30 p.m    Thursday-[place]  7:30 a.m-4:30 p.m    Typical Surgery Days: Tuesday or Friday [facility] [place]  [address] [place] [address]  PH: [phone]    Imaging Scheduling all locations   [phone] ([place])    [facility]  [address]   [place] [address]  PH: [phone]    [facility]  [address] [place] [address]  PH: [phone] Fax      **Surgeries** Our Surgery Schedulers will contact you to schedule. If you do not receive a call within 3 business days, please call [phone].    Urgent Care locations:  [facility] Monday-Friday  10 am - 8 pm  Saturday and Sunday   9 am - 5 pm  Monday-Friday   10 am - 8 pm  Saturday and Sunday   9 am - 5 pm   [phone]   If you need a medication refill, please contact your pharmacy. Please allow 3 business days for your refill to be completed.  As always, Thank you for trusting us with your healthcare needs!    see additional instructions from your care team below

## 2025-07-30 NOTE — TELEPHONE ENCOUNTER
Per insurance rationale on denial, she does not have severe enough sleep apnea for them to cover it based on her 2020 sleep study results.  For note AHI 9.1 is what was measured and 15 is the minimum cutoff for coverage.   Would need to query insurance on if they cover wegovy or option to have repeat sleep study completed     Baldemar Humphrey PA-C

## 2025-07-31 ENCOUNTER — TELEPHONE (OUTPATIENT)
Dept: OBGYN | Facility: CLINIC | Age: 55
End: 2025-07-31

## 2025-07-31 ENCOUNTER — OFFICE VISIT (OUTPATIENT)
Dept: OBGYN | Facility: CLINIC | Age: 55
End: 2025-07-31
Attending: PHYSICIAN ASSISTANT
Payer: COMMERCIAL

## 2025-07-31 VITALS
OXYGEN SATURATION: 96 % | SYSTOLIC BLOOD PRESSURE: 110 MMHG | WEIGHT: 190 LBS | HEART RATE: 105 BPM | DIASTOLIC BLOOD PRESSURE: 73 MMHG | BODY MASS INDEX: 33.66 KG/M2

## 2025-07-31 DIAGNOSIS — N95.0 POSTMENOPAUSAL BLEEDING: Primary | ICD-10-CM

## 2025-07-31 NOTE — TELEPHONE ENCOUNTER
Associated Diagnoses    Postmenopausal bleeding [N95.0]  - Primary        Source Order Set    Order Set Name Order ID    [id number]     Case Request: Case Info    Panel 1    Providers    Provider Role Service   Rafi Lemos MD Primary Gynecology     Procedures    Procedure Laterality Anesthesia Region   Diagnostic Hysteroscopy with biopsy [79892 (CPT )] N/A MAC with Local Vagina                  Requested date:   Location:  OR   Patient class:      Pre-op diagnoses: Postmenopausal bleeding     Scheduling Instructions    Additional Instructions for the Case  Procedure notes:    Procedure length:  30  Diagnosis:  Postmenopausal bleeding  Request additional equipment:    Location:  [facility]  Sterilization consent signed:  NA  OR staff assist:  no  H&P to be completed by PCP  Post op appointment needed:  yes 4 weeks  Scheduling instructions:     SURGERY SCHEDULING AND PRECERTIFICATION    Medical Record Number: [id number]  April Fischer  YOB: 1970   Phone: [phone] (home)   Primary Provider: Baldemar Humphrey    Reason for Admit:  ICD-10 CODE:  Postmenopausal bleeding [N95.0]    Surgeon:Rafi Lemos MD    Surgical Procedure: Diagnostic Hysteroscopy with biopsy, 29249    Date of Surgery 08/22/2025 Time of Surgery 9:30 AM  Surgery to be performed at:  [facility]   Status: Outpatient  Type of Anesthesia Anticipated: Local with MAC    Sterilization consent:  Not applicable to procedure being performed.    Pre-Op: On 08/13/2025 with TERESA Metcalf at [facility]   COVID testing:  Per Provider's discretion Covid testing is not indicated.     Post-Op:   4 weeks on 10/02/2025 with Rafi Lemos MD at [facility]   Pre-certification routed to [employer]:  Auto routes via Case Request    Surgery packet: Sent via [employer]  Patient instructed NPO 12 hours prior to surgery, arrive  according to the time the nurse gives patient when called prior to surgery, must have a .  Patient understood and agrees to the plan.      Requestor:  Jabier Barraza     Location:  [facility] [phone]

## 2025-08-01 ENCOUNTER — TELEPHONE (OUTPATIENT)
Dept: FAMILY MEDICINE | Facility: CLINIC | Age: 55
End: 2025-08-01
Payer: COMMERCIAL

## 2025-08-11 ENCOUNTER — MYC MEDICAL ADVICE (OUTPATIENT)
Dept: OBGYN | Facility: CLINIC | Age: 55
End: 2025-08-11
Payer: COMMERCIAL

## 2025-08-13 ENCOUNTER — OFFICE VISIT (OUTPATIENT)
Dept: FAMILY MEDICINE | Facility: CLINIC | Age: 55
End: 2025-08-13
Payer: COMMERCIAL

## 2025-08-13 VITALS
TEMPERATURE: 97.1 F | DIASTOLIC BLOOD PRESSURE: 80 MMHG | OXYGEN SATURATION: 97 % | WEIGHT: 191 LBS | RESPIRATION RATE: 14 BRPM | HEART RATE: 105 BPM | HEIGHT: 63 IN | BODY MASS INDEX: 33.84 KG/M2 | SYSTOLIC BLOOD PRESSURE: 128 MMHG

## 2025-08-13 DIAGNOSIS — Z01.818 PREOP GENERAL PHYSICAL EXAM: Primary | ICD-10-CM

## 2025-08-13 DIAGNOSIS — N95.0 POSTMENOPAUSAL BLEEDING: ICD-10-CM

## 2025-08-13 DIAGNOSIS — G47.33 OSA (OBSTRUCTIVE SLEEP APNEA): ICD-10-CM

## 2025-08-13 DIAGNOSIS — E66.811 CLASS 1 OBESITY: ICD-10-CM

## 2025-08-13 PROCEDURE — 3079F DIAST BP 80-89 MM HG: CPT | Performed by: PHYSICIAN ASSISTANT

## 2025-08-13 PROCEDURE — 99214 OFFICE O/P EST MOD 30 MIN: CPT | Performed by: PHYSICIAN ASSISTANT

## 2025-08-13 PROCEDURE — 93000 ELECTROCARDIOGRAM COMPLETE: CPT | Performed by: PHYSICIAN ASSISTANT

## 2025-08-13 PROCEDURE — 3074F SYST BP LT 130 MM HG: CPT | Performed by: PHYSICIAN ASSISTANT

## 2025-08-13 PROCEDURE — 1126F AMNT PAIN NOTED NONE PRSNT: CPT | Performed by: PHYSICIAN ASSISTANT

## 2025-08-13 ASSESSMENT — PAIN SCALES - GENERAL: PAINLEVEL_OUTOF10: NO PAIN (0)

## 2025-08-22 ENCOUNTER — HOSPITAL ENCOUNTER (OUTPATIENT)
Facility: AMBULATORY SURGERY CENTER | Age: 55
Discharge: HOME | End: 2025-08-22
Attending: OBSTETRICS & GYNECOLOGY | Admitting: OBSTETRICS & GYNECOLOGY
Payer: COMMERCIAL

## 2025-08-22 VITALS
SYSTOLIC BLOOD PRESSURE: 101 MMHG | DIASTOLIC BLOOD PRESSURE: 65 MMHG | OXYGEN SATURATION: 96 % | TEMPERATURE: 97.5 F | RESPIRATION RATE: 16 BRPM | HEART RATE: 70 BPM

## 2025-08-22 DIAGNOSIS — N84.0 ENDOMETRIAL POLYP: Primary | ICD-10-CM

## 2025-08-22 PROCEDURE — 58558 HYSTEROSCOPY BIOPSY: CPT | Performed by: OBSTETRICS & GYNECOLOGY

## 2025-08-22 PROCEDURE — G8907 PT DOC NO EVENTS ON DISCHARG: HCPCS

## 2025-08-22 PROCEDURE — 58558 HYSTEROSCOPY BIOPSY: CPT

## 2025-08-22 PROCEDURE — 88305 TISSUE EXAM BY PATHOLOGIST: CPT | Performed by: PATHOLOGY

## 2025-08-22 PROCEDURE — G8918 PT W/O PREOP ORDER IV AB PRO: HCPCS

## 2025-08-22 RX ORDER — OXYCODONE HYDROCHLORIDE 5 MG/1
10 TABLET ORAL
Status: DISCONTINUED | OUTPATIENT
Start: 2025-08-22 | End: 2025-08-23 | Stop reason: HOSPADM

## 2025-08-22 RX ORDER — ONDANSETRON 2 MG/ML
4 INJECTION INTRAMUSCULAR; INTRAVENOUS EVERY 30 MIN PRN
Status: DISCONTINUED | OUTPATIENT
Start: 2025-08-22 | End: 2025-08-23 | Stop reason: HOSPADM

## 2025-08-22 RX ORDER — LIDOCAINE 40 MG/G
CREAM TOPICAL
Status: DISCONTINUED | OUTPATIENT
Start: 2025-08-22 | End: 2025-08-23 | Stop reason: HOSPADM

## 2025-08-22 RX ORDER — ACETAMINOPHEN 325 MG/1
975 TABLET ORAL ONCE
Status: CANCELLED | OUTPATIENT
Start: 2025-08-22 | End: 2025-08-22

## 2025-08-22 RX ORDER — SODIUM CHLORIDE, SODIUM LACTATE, POTASSIUM CHLORIDE, CALCIUM CHLORIDE 600; 310; 30; 20 MG/100ML; MG/100ML; MG/100ML; MG/100ML
INJECTION, SOLUTION INTRAVENOUS CONTINUOUS
Status: DISCONTINUED | OUTPATIENT
Start: 2025-08-22 | End: 2025-08-23 | Stop reason: HOSPADM

## 2025-08-22 RX ORDER — ACETAMINOPHEN 325 MG/1
975 TABLET ORAL ONCE
Status: DISCONTINUED | OUTPATIENT
Start: 2025-08-22 | End: 2025-08-23 | Stop reason: HOSPADM

## 2025-08-22 RX ORDER — ACETAMINOPHEN 325 MG/1
975 TABLET ORAL ONCE
Status: COMPLETED | OUTPATIENT
Start: 2025-08-22 | End: 2025-08-22

## 2025-08-22 RX ORDER — ONDANSETRON 4 MG/1
4 TABLET, ORALLY DISINTEGRATING ORAL EVERY 30 MIN PRN
Status: DISCONTINUED | OUTPATIENT
Start: 2025-08-22 | End: 2025-08-23 | Stop reason: HOSPADM

## 2025-08-22 RX ORDER — NALOXONE HYDROCHLORIDE 0.4 MG/ML
0.1 INJECTION, SOLUTION INTRAMUSCULAR; INTRAVENOUS; SUBCUTANEOUS
Status: DISCONTINUED | OUTPATIENT
Start: 2025-08-22 | End: 2025-08-23 | Stop reason: HOSPADM

## 2025-08-22 RX ORDER — OXYCODONE HYDROCHLORIDE 5 MG/1
5 TABLET ORAL
Status: DISCONTINUED | OUTPATIENT
Start: 2025-08-22 | End: 2025-08-23 | Stop reason: HOSPADM

## 2025-08-22 RX ORDER — IBUPROFEN 400 MG/1
800 TABLET, FILM COATED ORAL ONCE
Status: CANCELLED | OUTPATIENT
Start: 2025-08-22 | End: 2025-08-22

## 2025-08-22 RX ORDER — BUPIVACAINE HCL/EPINEPHRINE 0.5-1:200K
VIAL (ML) INJECTION PRN
Status: DISCONTINUED | OUTPATIENT
Start: 2025-08-22 | End: 2025-08-22 | Stop reason: HOSPADM

## 2025-08-22 RX ORDER — DEXAMETHASONE SODIUM PHOSPHATE 4 MG/ML
4 INJECTION, SOLUTION INTRA-ARTICULAR; INTRALESIONAL; INTRAMUSCULAR; INTRAVENOUS; SOFT TISSUE
Status: DISCONTINUED | OUTPATIENT
Start: 2025-08-22 | End: 2025-08-23 | Stop reason: HOSPADM

## 2025-08-22 RX ADMIN — ACETAMINOPHEN 975 MG: 325 TABLET ORAL at 08:39

## 2025-08-26 LAB
PATH REPORT.COMMENTS IMP SPEC: NORMAL
PATH REPORT.COMMENTS IMP SPEC: NORMAL
PATH REPORT.FINAL DX SPEC: NORMAL
PATH REPORT.GROSS SPEC: NORMAL
PATH REPORT.MICROSCOPIC SPEC OTHER STN: NORMAL
PATH REPORT.RELEVANT HX SPEC: NORMAL
PHOTO IMAGE: NORMAL

## 2025-08-27 ENCOUNTER — RESULTS FOLLOW-UP (OUTPATIENT)
Dept: OBGYN | Facility: CLINIC | Age: 55
End: 2025-08-27
Payer: COMMERCIAL

## 2025-08-27 ENCOUNTER — TELEPHONE (OUTPATIENT)
Dept: FAMILY MEDICINE | Facility: CLINIC | Age: 55
End: 2025-08-27
Payer: COMMERCIAL

## 2025-08-29 ENCOUNTER — TELEPHONE (OUTPATIENT)
Dept: FAMILY MEDICINE | Facility: CLINIC | Age: 55
End: 2025-08-29
Payer: COMMERCIAL

## 2025-09-02 ENCOUNTER — MYC MEDICAL ADVICE (OUTPATIENT)
Dept: FAMILY MEDICINE | Facility: CLINIC | Age: 55
End: 2025-09-02
Payer: COMMERCIAL

## 2025-09-11 ENCOUNTER — OFFICE VISIT (OUTPATIENT)
Dept: FAMILY MEDICINE | Facility: CLINIC | Age: 55
End: 2025-09-11
Payer: COMMERCIAL

## 2025-09-11 DIAGNOSIS — Z11.59 NEED FOR HEPATITIS C SCREENING TEST: Primary | ICD-10-CM

## (undated) DEVICE — NDL SPINAL 22GA 3.5" QUINCKE 405181

## (undated) DEVICE — KIT PROCEDURE FLUENT IN/OUT FLOWPAK TISS TRAP FLT-112S

## (undated) DEVICE — SOL WATER IRRIG 1000ML BOTTLE 07139-09

## (undated) DEVICE — KIT ENDO FIRST STEP DISINFECTANT 200ML W/POUCH EP-4

## (undated) DEVICE — NDL 19GA 1.5"

## (undated) DEVICE — MG MINOR GYN PACK SMACNMGMGA

## (undated) DEVICE — PAD PERI W/WINGS 1580A

## (undated) DEVICE — SEAL SET MYOSURE ROD LENS SCOPE SINGLE USE 40-902

## (undated) DEVICE — PREP POVIDONE-IODINE 10% SOLUTION 4OZ BOTTLE MDS093944

## (undated) DEVICE — GLOVE PROTEXIS MICRO 7.5 LT BLUE 2D73PM75

## (undated) DEVICE — PREP POVIDONE-IODINE 7.5% SCRUB 4OZ BOTTLE MDS093945

## (undated) DEVICE — PAD CHUX UNDERPAD 23X24" 7136

## (undated) DEVICE — SOL NACL 0.9% IRRIG 3000ML BAG 07972-08

## (undated) RX ORDER — PROPOFOL 10 MG/ML
INJECTION, EMULSION INTRAVENOUS
Status: DISPENSED
Start: 2025-08-22

## (undated) RX ORDER — FENTANYL CITRATE 50 UG/ML
INJECTION, SOLUTION INTRAMUSCULAR; INTRAVENOUS
Status: DISPENSED
Start: 2025-08-22

## (undated) RX ORDER — ACETAMINOPHEN 325 MG/1
TABLET ORAL
Status: DISPENSED
Start: 2025-08-22

## (undated) RX ORDER — KETOROLAC TROMETHAMINE 30 MG/ML
INJECTION, SOLUTION INTRAMUSCULAR; INTRAVENOUS
Status: DISPENSED
Start: 2025-08-22